# Patient Record
Sex: MALE | Race: WHITE | NOT HISPANIC OR LATINO | URBAN - METROPOLITAN AREA
[De-identification: names, ages, dates, MRNs, and addresses within clinical notes are randomized per-mention and may not be internally consistent; named-entity substitution may affect disease eponyms.]

---

## 2017-02-24 ENCOUNTER — OUTPATIENT (OUTPATIENT)
Dept: OUTPATIENT SERVICES | Facility: HOSPITAL | Age: 57
LOS: 1 days | Discharge: HOME | End: 2017-02-24

## 2017-06-21 ENCOUNTER — OUTPATIENT (OUTPATIENT)
Dept: OUTPATIENT SERVICES | Facility: HOSPITAL | Age: 57
LOS: 1 days | Discharge: HOME | End: 2017-06-21

## 2017-06-21 DIAGNOSIS — K56.609 UNSPECIFIED INTESTINAL OBSTRUCTION, UNSPECIFIED AS TO PARTIAL VERSUS COMPLETE OBSTRUCTION: ICD-10-CM

## 2017-06-27 DIAGNOSIS — B17.10 ACUTE HEPATITIS C WITHOUT HEPATIC COMA: ICD-10-CM

## 2017-06-27 DIAGNOSIS — B99.9 UNSPECIFIED INFECTIOUS DISEASE: ICD-10-CM

## 2017-06-27 DIAGNOSIS — B18.1 CHRONIC VIRAL HEPATITIS B WITHOUT DELTA-AGENT: ICD-10-CM

## 2017-06-27 DIAGNOSIS — B18.2 CHRONIC VIRAL HEPATITIS C: ICD-10-CM

## 2017-06-28 DIAGNOSIS — J98.4 OTHER DISORDERS OF LUNG: ICD-10-CM

## 2017-07-24 ENCOUNTER — OUTPATIENT (OUTPATIENT)
Dept: OUTPATIENT SERVICES | Facility: HOSPITAL | Age: 57
LOS: 1 days | Discharge: HOME | End: 2017-07-24

## 2017-07-24 DIAGNOSIS — K56.609 UNSPECIFIED INTESTINAL OBSTRUCTION, UNSPECIFIED AS TO PARTIAL VERSUS COMPLETE OBSTRUCTION: ICD-10-CM

## 2017-07-24 DIAGNOSIS — R19.00 INTRA-ABDOMINAL AND PELVIC SWELLING, MASS AND LUMP, UNSPECIFIED SITE: ICD-10-CM

## 2017-09-15 ENCOUNTER — OUTPATIENT (OUTPATIENT)
Dept: OUTPATIENT SERVICES | Facility: HOSPITAL | Age: 57
LOS: 1 days | Discharge: HOME | End: 2017-09-15

## 2017-09-15 DIAGNOSIS — K56.609 UNSPECIFIED INTESTINAL OBSTRUCTION, UNSPECIFIED AS TO PARTIAL VERSUS COMPLETE OBSTRUCTION: ICD-10-CM

## 2017-09-15 DIAGNOSIS — R91.8 OTHER NONSPECIFIC ABNORMAL FINDING OF LUNG FIELD: ICD-10-CM

## 2018-08-27 PROBLEM — Z00.00 ENCOUNTER FOR PREVENTIVE HEALTH EXAMINATION: Status: ACTIVE | Noted: 2018-08-27

## 2018-10-02 ENCOUNTER — APPOINTMENT (OUTPATIENT)
Dept: UROLOGY | Facility: CLINIC | Age: 58
End: 2018-10-02
Payer: COMMERCIAL

## 2018-10-02 VITALS
WEIGHT: 214 LBS | BODY MASS INDEX: 32.43 KG/M2 | SYSTOLIC BLOOD PRESSURE: 127 MMHG | HEART RATE: 86 BPM | HEIGHT: 68 IN | DIASTOLIC BLOOD PRESSURE: 82 MMHG

## 2018-10-02 PROCEDURE — 99204 OFFICE O/P NEW MOD 45 MIN: CPT

## 2018-11-05 ENCOUNTER — APPOINTMENT (OUTPATIENT)
Dept: UROLOGY | Facility: CLINIC | Age: 58
End: 2018-11-05

## 2018-11-06 ENCOUNTER — OUTPATIENT (OUTPATIENT)
Dept: OUTPATIENT SERVICES | Facility: HOSPITAL | Age: 58
LOS: 1 days | Discharge: HOME | End: 2018-11-06

## 2018-11-06 DIAGNOSIS — N52.9 MALE ERECTILE DYSFUNCTION, UNSPECIFIED: ICD-10-CM

## 2018-11-13 ENCOUNTER — APPOINTMENT (OUTPATIENT)
Dept: UROLOGY | Facility: CLINIC | Age: 58
End: 2018-11-13
Payer: COMMERCIAL

## 2018-11-13 VITALS
DIASTOLIC BLOOD PRESSURE: 86 MMHG | SYSTOLIC BLOOD PRESSURE: 122 MMHG | HEIGHT: 68 IN | WEIGHT: 214 LBS | HEART RATE: 71 BPM | BODY MASS INDEX: 32.43 KG/M2

## 2018-11-13 PROCEDURE — 99214 OFFICE O/P EST MOD 30 MIN: CPT

## 2018-11-19 ENCOUNTER — APPOINTMENT (OUTPATIENT)
Dept: UROLOGY | Facility: CLINIC | Age: 58
End: 2018-11-19
Payer: COMMERCIAL

## 2018-11-19 VITALS
WEIGHT: 214 LBS | HEIGHT: 68 IN | SYSTOLIC BLOOD PRESSURE: 120 MMHG | HEART RATE: 63 BPM | DIASTOLIC BLOOD PRESSURE: 79 MMHG | BODY MASS INDEX: 32.43 KG/M2

## 2018-11-19 PROCEDURE — 99214 OFFICE O/P EST MOD 30 MIN: CPT

## 2018-11-19 NOTE — HISTORY OF PRESENT ILLNESS
[Currently Experiencing ___] :  [unfilled] [Erectile Dysfunction] : Erectile Dysfunction [None] : None [FreeTextEntry1] : ALVAREZ BARRIOS is a 58 year old male with a past medical history of Nephrolithiasis and LUTS and following Dr. Sun. Presents to the office today for ED x 2 years and worsening over time. Patient is happily  x 32 years. Patient states that he is bale to get a weak erection and unable to sustain longer than one minute. Unable to penetrate x 3 months. No issues with ejaculation. Intermittent morning erections. Denies trauma to genital area. Upon self stimulation, patient states erection is weak. Patient has anxiety and discontinued Clonazepam 6 months ago. Has strong libido. In the past patient has tried Cialis and Viagra and no result and patient was getting headache. PSA 0.6 ng/ml , % Free PSA 41.7 % on 10/30/2018. \par \par IIEF-5  Score 6.- SEVERE ED\par \par \par 9/5/2018\par -Testosterone 440 ([id number])\par \par -Using sterile technique Trimix (30 mg papaverine, 1 mg phentolamine, 10 ug alprostadil) was injected. Injected 0.2 ml to the right corpora. The patient was instructed to apply pressure to the injection site for 5 minutes and I returned to reassess after 20 minutes. The results were as follows :    ---90--- % erections, and very satisfied with results.\par

## 2018-11-19 NOTE — LETTER BODY
[Dear  ___] : Dear  [unfilled], [Consult Letter:] : I had the pleasure of evaluating your patient, [unfilled]. [Please see my note below.] : Please see my note below. [Consult Closing:] : Thank you very much for allowing me to participate in the care of this patient.  If you have any questions, please do not hesitate to contact me. [Sincerely,] : Sincerely, [FreeTextEntry2] : Dr. Ed Anders

## 2018-11-19 NOTE — ASSESSMENT
[FreeTextEntry1] : ALVAREZ BARRIOS is a 58 year old male with a past medical history of Nephrolithiasis and LUTS and following Dr. Sun. Presents to the office today for ED x 2 years and worsening over time. Patient is happily  x 32 years. Patient states that he is bale to get a weak erection and unable to sustain longer than one minute. Unable to penetrate x 3 months. No issues with ejaculation. Intermittent morning erections. Denies trauma to genital area. Upon self stimulation, patient states erection is weak. Patient has anxiety and discontinued Clonazepam 6 months ago. Has strong libido. In the past patient has tried Cialis and Viagra and no result and patient was getting headache. PSA 0.6 ng/ml , % Free PSA 41.7 % on 10/30/2018. \par \par IIEF-5  Score 6.- SEVERE ED\par \par \par 9/5/2018\par -Testosterone 440 ([id number])\par \par -Using sterile technique Trimix (30 mg papaverine, 1 mg phentolamine, 10 ug alprostadil) was injected. Injected 0.2 ml to the right corpora. The patient was instructed to apply pressure to the injection site for 5 minutes and I returned to reassess after 20 minutes. The results were as follows :    ---90--- % erections, and very satisfied with results.

## 2019-02-05 ENCOUNTER — OUTPATIENT (OUTPATIENT)
Dept: OUTPATIENT SERVICES | Facility: HOSPITAL | Age: 59
LOS: 1 days | Discharge: HOME | End: 2019-02-05

## 2019-02-05 DIAGNOSIS — R91.8 OTHER NONSPECIFIC ABNORMAL FINDING OF LUNG FIELD: ICD-10-CM

## 2019-02-27 ENCOUNTER — APPOINTMENT (OUTPATIENT)
Dept: UROLOGY | Facility: CLINIC | Age: 59
End: 2019-02-27
Payer: COMMERCIAL

## 2019-02-27 VITALS — SYSTOLIC BLOOD PRESSURE: 127 MMHG | DIASTOLIC BLOOD PRESSURE: 85 MMHG | HEART RATE: 81 BPM

## 2019-02-27 VITALS — WEIGHT: 214 LBS | BODY MASS INDEX: 32.43 KG/M2 | HEIGHT: 68 IN

## 2019-02-27 PROCEDURE — 99213 OFFICE O/P EST LOW 20 MIN: CPT

## 2019-02-27 NOTE — HISTORY OF PRESENT ILLNESS
[FreeTextEntry1] : ALVAREZ BARRIOS is a 58 year old male with a past medical history of Nephrolithiasis and LUTS and following Dr. Sun. Presents to the office today for ED x 2 years and worsening over time. Patient is happily  x 32 years. Patient states that without trimix he is bale to get a weak erection and unable to sustain longer than one minute. Unable to penetrate x 3 months. No issues with ejaculation. Intermittent morning erections. Denies trauma to genital area. Upon self stimulation, patient states erection is weak. \par \par prescribed trimix last visit and he is very happy with 0.2ml of trimix\par \par Has strong libido. In the past patient has tried Cialis and Viagra and no result and patient was getting headache. PSA 0.6 ng/ml , % Free PSA 41.7 % on 10/30/2018. \par \par 9/5/2018\par -Testosterone 440 ([id number])

## 2019-02-27 NOTE — ASSESSMENT
[FreeTextEntry1] : ALVAREZ BARRIOS is a 58 year old male with a past medical history of Nephrolithiasis and LUTS and following Dr. Sun. Presents to the office today for ED x 2 years and worsening over time. Patient is happily  x 32 years. Patient states that without trimix he is bale to get a weak erection and unable to sustain longer than one minute. Unable to penetrate x 3 months. No issues with ejaculation. Intermittent morning erections. Denies trauma to genital area. Upon self stimulation, patient states erection is weak. \par \par prescribed trimix last visit and he is very happy with 0.2ml of trimix\par \par Has strong libido. In the past patient has tried Cialis and Viagra and no result and patient was getting headache. PSA 0.6 ng/ml , % Free PSA 41.7 % on 10/30/2018. \par \par 9/5/2018\par -Testosterone 440 ([id number])

## 2019-02-27 NOTE — PHYSICAL EXAM
[General Appearance - Well Developed] : well developed [General Appearance - Well Nourished] : well nourished [Normal Appearance] : normal appearance [Well Groomed] : well groomed [General Appearance - In No Acute Distress] : no acute distress [Abdomen Soft] : soft [Abdomen Tenderness] : non-tender [Costovertebral Angle Tenderness] : no ~M costovertebral angle tenderness [Skin Color & Pigmentation] : normal skin color and pigmentation [Edema] : no peripheral edema [] : no respiratory distress [Respiration, Rhythm And Depth] : normal respiratory rhythm and effort [Exaggerated Use Of Accessory Muscles For Inspiration] : no accessory muscle use [Oriented To Time, Place, And Person] : oriented to person, place, and time [Affect] : the affect was normal [Mood] : the mood was normal [Not Anxious] : not anxious [Normal Station and Gait] : the gait and station were normal for the patient's age [No Focal Deficits] : no focal deficits

## 2019-03-20 ENCOUNTER — APPOINTMENT (OUTPATIENT)
Dept: SURGERY | Facility: CLINIC | Age: 59
End: 2019-03-20
Payer: COMMERCIAL

## 2019-03-20 VITALS
BODY MASS INDEX: 31.83 KG/M2 | HEIGHT: 68 IN | SYSTOLIC BLOOD PRESSURE: 120 MMHG | DIASTOLIC BLOOD PRESSURE: 86 MMHG | WEIGHT: 210 LBS

## 2019-03-20 DIAGNOSIS — Z94.84 STEM CELLS TRANSPLANT STATUS: ICD-10-CM

## 2019-03-20 DIAGNOSIS — J34.2 DEVIATED NASAL SEPTUM: ICD-10-CM

## 2019-03-20 PROCEDURE — 99242 OFF/OP CONSLTJ NEW/EST SF 20: CPT

## 2019-03-20 NOTE — ASSESSMENT
[FreeTextEntry1] : 57yo male with umbilical hernia with overly calcified cyst and rectus diasthasis. \par -recommend no intervention for diasthasis\par -recommend open umbilical hernia repair with mesh and excision of cyst\par -explained risks/benefits/alternatives, all questions were answered\par -consent obtained for hernia repair\par -patient has been working out and trying to lose weight - encouraged to continue losing weight\par -will need PCP clearance and PAT's pre-operatively

## 2019-03-20 NOTE — HISTORY OF PRESENT ILLNESS
[de-identified] : 59yo male with PMHx of urinary issues referred for evaluation of umbilical hernia. Patient reports having an umbilical hernia his whole life; however, in the past year, he has noted a hardening and darkening of the overlying skin. Patient denies fever/chills, abdominal pain, nausea/vomiting, obstipation. He also notes that his upper abdomen peaks when he flexes his abdomen. He denies pain or discomfort in the area, but finds it superficially bothersome.

## 2019-03-20 NOTE — CONSULT LETTER
[Dear  ___] : Dear  [unfilled], [Consult Letter:] : I had the pleasure of evaluating your patient, [unfilled]. [Sincerely,] : Sincerely, [Please see my note below.] : Please see my note below. [Consult Closing:] : Thank you very much for allowing me to participate in the care of this patient.  If you have any questions, please do not hesitate to contact me. [FreeTextEntry3] : Natalia Slade MD\par Bariatric & Minimally Invasive Surgery\par [facility]\par [phone]

## 2019-03-20 NOTE — PHYSICAL EXAM
[Normal Breath Sounds] : Normal breath sounds [Normal Heart Sounds] : normal heart sounds [Abdominal Masses] : No abdominal masses [Abdomen Tenderness] : ~T ~M No abdominal tenderness [Alert] : alert [Calm] : calm [de-identified] : palpable approximately 2.5cm umbilical fascial defect without contents, overlying calcified skin lesion, non-tender, non-fluctuant [de-identified] : no acute distress [de-identified] : full ROM x 4

## 2019-03-27 ENCOUNTER — OUTPATIENT (OUTPATIENT)
Dept: OUTPATIENT SERVICES | Facility: HOSPITAL | Age: 59
LOS: 1 days | Discharge: HOME | End: 2019-03-27

## 2019-03-27 VITALS
TEMPERATURE: 98 F | DIASTOLIC BLOOD PRESSURE: 85 MMHG | HEIGHT: 68 IN | WEIGHT: 215.39 LBS | HEART RATE: 77 BPM | RESPIRATION RATE: 20 BRPM | SYSTOLIC BLOOD PRESSURE: 145 MMHG | OXYGEN SATURATION: 100 %

## 2019-03-27 DIAGNOSIS — Z98.890 OTHER SPECIFIED POSTPROCEDURAL STATES: Chronic | ICD-10-CM

## 2019-03-27 DIAGNOSIS — Z01.818 ENCOUNTER FOR OTHER PREPROCEDURAL EXAMINATION: ICD-10-CM

## 2019-03-27 DIAGNOSIS — K42.9 UMBILICAL HERNIA WITHOUT OBSTRUCTION OR GANGRENE: ICD-10-CM

## 2019-03-27 LAB
ALBUMIN SERPL ELPH-MCNC: 4.5 G/DL — SIGNIFICANT CHANGE UP (ref 3.5–5.2)
ALP SERPL-CCNC: 58 U/L — SIGNIFICANT CHANGE UP (ref 30–115)
ALT FLD-CCNC: 35 U/L — SIGNIFICANT CHANGE UP (ref 0–41)
ANION GAP SERPL CALC-SCNC: 13 MMOL/L — SIGNIFICANT CHANGE UP (ref 7–14)
APTT BLD: 32.8 SEC — SIGNIFICANT CHANGE UP (ref 27–39.2)
AST SERPL-CCNC: 27 U/L — SIGNIFICANT CHANGE UP (ref 0–41)
BASOPHILS # BLD AUTO: 0.08 K/UL — SIGNIFICANT CHANGE UP (ref 0–0.2)
BASOPHILS NFR BLD AUTO: 1 % — SIGNIFICANT CHANGE UP (ref 0–1)
BILIRUB SERPL-MCNC: <0.2 MG/DL — SIGNIFICANT CHANGE UP (ref 0.2–1.2)
BUN SERPL-MCNC: 24 MG/DL — HIGH (ref 10–20)
CALCIUM SERPL-MCNC: 9.6 MG/DL — SIGNIFICANT CHANGE UP (ref 8.5–10.1)
CHLORIDE SERPL-SCNC: 102 MMOL/L — SIGNIFICANT CHANGE UP (ref 98–110)
CO2 SERPL-SCNC: 25 MMOL/L — SIGNIFICANT CHANGE UP (ref 17–32)
CREAT SERPL-MCNC: 1.3 MG/DL — SIGNIFICANT CHANGE UP (ref 0.7–1.5)
EOSINOPHIL # BLD AUTO: 0.14 K/UL — SIGNIFICANT CHANGE UP (ref 0–0.7)
EOSINOPHIL NFR BLD AUTO: 1.8 % — SIGNIFICANT CHANGE UP (ref 0–8)
GLUCOSE SERPL-MCNC: 93 MG/DL — SIGNIFICANT CHANGE UP (ref 70–99)
HCT VFR BLD CALC: 44 % — SIGNIFICANT CHANGE UP (ref 42–52)
HGB BLD-MCNC: 14.3 G/DL — SIGNIFICANT CHANGE UP (ref 14–18)
IMM GRANULOCYTES NFR BLD AUTO: 0.3 % — SIGNIFICANT CHANGE UP (ref 0.1–0.3)
INR BLD: 0.97 RATIO — SIGNIFICANT CHANGE UP (ref 0.65–1.3)
LYMPHOCYTES # BLD AUTO: 1.35 K/UL — SIGNIFICANT CHANGE UP (ref 1.2–3.4)
LYMPHOCYTES # BLD AUTO: 17 % — LOW (ref 20.5–51.1)
MCHC RBC-ENTMCNC: 27.7 PG — SIGNIFICANT CHANGE UP (ref 27–31)
MCHC RBC-ENTMCNC: 32.5 G/DL — SIGNIFICANT CHANGE UP (ref 32–37)
MCV RBC AUTO: 85.1 FL — SIGNIFICANT CHANGE UP (ref 80–94)
MONOCYTES # BLD AUTO: 0.64 K/UL — HIGH (ref 0.1–0.6)
MONOCYTES NFR BLD AUTO: 8.1 % — SIGNIFICANT CHANGE UP (ref 1.7–9.3)
NEUTROPHILS # BLD AUTO: 5.69 K/UL — SIGNIFICANT CHANGE UP (ref 1.4–6.5)
NEUTROPHILS NFR BLD AUTO: 71.8 % — SIGNIFICANT CHANGE UP (ref 42.2–75.2)
NRBC # BLD: 0 /100 WBCS — SIGNIFICANT CHANGE UP (ref 0–0)
PLATELET # BLD AUTO: 266 K/UL — SIGNIFICANT CHANGE UP (ref 130–400)
POTASSIUM SERPL-MCNC: 4.6 MMOL/L — SIGNIFICANT CHANGE UP (ref 3.5–5)
POTASSIUM SERPL-SCNC: 4.6 MMOL/L — SIGNIFICANT CHANGE UP (ref 3.5–5)
PROT SERPL-MCNC: 7.1 G/DL — SIGNIFICANT CHANGE UP (ref 6–8)
PROTHROM AB SERPL-ACNC: 11.2 SEC — SIGNIFICANT CHANGE UP (ref 9.95–12.87)
RBC # BLD: 5.17 M/UL — SIGNIFICANT CHANGE UP (ref 4.7–6.1)
RBC # FLD: 13.1 % — SIGNIFICANT CHANGE UP (ref 11.5–14.5)
SODIUM SERPL-SCNC: 140 MMOL/L — SIGNIFICANT CHANGE UP (ref 135–146)
WBC # BLD: 7.92 K/UL — SIGNIFICANT CHANGE UP (ref 4.8–10.8)
WBC # FLD AUTO: 7.92 K/UL — SIGNIFICANT CHANGE UP (ref 4.8–10.8)

## 2019-03-27 NOTE — H&P PST ADULT - HISTORY OF PRESENT ILLNESS
57 y/o male scheduled for umbilical hernia repair  reports no c/o cp,sob,palpitations,cough or dysuria  1-2 fos without sob

## 2019-03-28 DIAGNOSIS — F41.9 ANXIETY DISORDER, UNSPECIFIED: ICD-10-CM

## 2019-04-01 ENCOUNTER — OUTPATIENT (OUTPATIENT)
Dept: OUTPATIENT SERVICES | Facility: HOSPITAL | Age: 59
LOS: 1 days | Discharge: HOME | End: 2019-04-01
Payer: COMMERCIAL

## 2019-04-01 ENCOUNTER — RESULT REVIEW (OUTPATIENT)
Age: 59
End: 2019-04-01

## 2019-04-01 VITALS
RESPIRATION RATE: 20 BRPM | DIASTOLIC BLOOD PRESSURE: 81 MMHG | SYSTOLIC BLOOD PRESSURE: 133 MMHG | HEIGHT: 68 IN | TEMPERATURE: 98 F | HEART RATE: 72 BPM | WEIGHT: 214.95 LBS

## 2019-04-01 VITALS — RESPIRATION RATE: 16 BRPM | DIASTOLIC BLOOD PRESSURE: 86 MMHG | SYSTOLIC BLOOD PRESSURE: 143 MMHG | HEART RATE: 54 BPM

## 2019-04-01 DIAGNOSIS — Z98.890 OTHER SPECIFIED POSTPROCEDURAL STATES: Chronic | ICD-10-CM

## 2019-04-01 PROCEDURE — 88302 TISSUE EXAM BY PATHOLOGIST: CPT | Mod: 26

## 2019-04-01 PROCEDURE — 49585: CPT

## 2019-04-01 RX ORDER — HYDROMORPHONE HYDROCHLORIDE 2 MG/ML
0.5 INJECTION INTRAMUSCULAR; INTRAVENOUS; SUBCUTANEOUS
Qty: 0 | Refills: 0 | Status: DISCONTINUED | OUTPATIENT
Start: 2019-04-01 | End: 2019-04-01

## 2019-04-01 RX ORDER — HYDROMORPHONE HYDROCHLORIDE 2 MG/ML
1 INJECTION INTRAMUSCULAR; INTRAVENOUS; SUBCUTANEOUS
Qty: 0 | Refills: 0 | Status: DISCONTINUED | OUTPATIENT
Start: 2019-04-01 | End: 2019-04-01

## 2019-04-01 RX ORDER — ONDANSETRON 8 MG/1
4 TABLET, FILM COATED ORAL ONCE
Qty: 0 | Refills: 0 | Status: DISCONTINUED | OUTPATIENT
Start: 2019-04-01 | End: 2019-04-16

## 2019-04-01 RX ORDER — DOCUSATE SODIUM 100 MG
1 CAPSULE ORAL
Qty: 20 | Refills: 0 | OUTPATIENT
Start: 2019-04-01

## 2019-04-01 RX ORDER — SODIUM CHLORIDE 9 MG/ML
1000 INJECTION, SOLUTION INTRAVENOUS
Qty: 0 | Refills: 0 | Status: DISCONTINUED | OUTPATIENT
Start: 2019-04-01 | End: 2019-04-16

## 2019-04-01 RX ADMIN — SODIUM CHLORIDE 100 MILLILITER(S): 9 INJECTION, SOLUTION INTRAVENOUS at 14:28

## 2019-04-01 RX ADMIN — HYDROMORPHONE HYDROCHLORIDE 1 MILLIGRAM(S): 2 INJECTION INTRAMUSCULAR; INTRAVENOUS; SUBCUTANEOUS at 15:19

## 2019-04-01 RX ADMIN — HYDROMORPHONE HYDROCHLORIDE 0.5 MILLIGRAM(S): 2 INJECTION INTRAMUSCULAR; INTRAVENOUS; SUBCUTANEOUS at 14:30

## 2019-04-01 RX ADMIN — HYDROMORPHONE HYDROCHLORIDE 1 MILLIGRAM(S): 2 INJECTION INTRAMUSCULAR; INTRAVENOUS; SUBCUTANEOUS at 15:34

## 2019-04-01 RX ADMIN — HYDROMORPHONE HYDROCHLORIDE 0.5 MILLIGRAM(S): 2 INJECTION INTRAMUSCULAR; INTRAVENOUS; SUBCUTANEOUS at 15:08

## 2019-04-01 NOTE — CHART NOTE - NSCHARTNOTEFT_GEN_A_CORE
PACU ANESTHESIA ADMISSION NOTE      Procedure: Umbilical herniorrhaphy  Post op diagnosis:  umbilical hernia,     ____  Intubated  TV:______       Rate: ______      FiO2: ______    _x___  Patent Airway    _x___  Full return of protective reflexes    _x___  Full recovery from anesthesia / back to baseline status    Vitals:  see anesthesia record    Mental Status:  _x___ Awake   _____ Alert   _____ Drowsy   _____ Sedated    Nausea/Vomiting:  _x___  NO       ______Yes,   See Post - Op Orders         Pain Scale (0-10):  _____    Treatment: ____ None    __x__ See Post - Op/PCA Orders    Post - Operative Fluids:   ____ Oral   ___x_ See Post - Op Orders    Plan: Discharge:   __x__Home       _____Floor     _____Critical Care    _____  Other:_________________    Comments:  No anesthesia issues or complications noted.  Discharge when criteria met.

## 2019-04-01 NOTE — ASU DISCHARGE PLAN (ADULT/PEDIATRIC) - CARE PROVIDER_API CALL
Natalia Slade)  [facility]  [address] [place] [address]  Phone: [phone]  Fax: [phone]  Follow Up Time:

## 2019-04-04 LAB — SURGICAL PATHOLOGY STUDY: SIGNIFICANT CHANGE UP

## 2019-04-06 DIAGNOSIS — L72.0 EPIDERMAL CYST: ICD-10-CM

## 2019-04-06 DIAGNOSIS — G47.33 OBSTRUCTIVE SLEEP APNEA (ADULT) (PEDIATRIC): ICD-10-CM

## 2019-04-06 DIAGNOSIS — K42.9 UMBILICAL HERNIA WITHOUT OBSTRUCTION OR GANGRENE: ICD-10-CM

## 2019-04-06 DIAGNOSIS — Z99.89 DEPENDENCE ON OTHER ENABLING MACHINES AND DEVICES: ICD-10-CM

## 2019-04-12 ENCOUNTER — APPOINTMENT (OUTPATIENT)
Dept: SURGERY | Facility: CLINIC | Age: 59
End: 2019-04-12

## 2019-04-12 VITALS
WEIGHT: 210 LBS | DIASTOLIC BLOOD PRESSURE: 86 MMHG | BODY MASS INDEX: 31.83 KG/M2 | HEIGHT: 68 IN | SYSTOLIC BLOOD PRESSURE: 120 MMHG

## 2019-04-12 DIAGNOSIS — L72.0 EPIDERMAL CYST: ICD-10-CM

## 2019-04-12 DIAGNOSIS — Z83.3 FAMILY HISTORY OF DIABETES MELLITUS: ICD-10-CM

## 2019-04-12 DIAGNOSIS — Z80.43 FAMILY HISTORY OF MALIGNANT NEOPLASM OF TESTIS: ICD-10-CM

## 2019-04-12 DIAGNOSIS — Z78.9 OTHER SPECIFIED HEALTH STATUS: ICD-10-CM

## 2019-04-12 DIAGNOSIS — Z80.3 FAMILY HISTORY OF MALIGNANT NEOPLASM OF BREAST: ICD-10-CM

## 2019-04-12 NOTE — PHYSICAL EXAM
[Normal Breath Sounds] : Normal breath sounds [Normal Heart Sounds] : normal heart sounds [Abdominal Masses] : No abdominal masses [Abdomen Tenderness] : ~T ~M No abdominal tenderness [Alert] : alert [Calm] : calm [de-identified] : no acute distress [de-identified] : well-healing vertical umbilical incision without erythema, induration or drainage; non-tender, +rectus diasthasis [de-identified] : left upper back lipomatous mass 5x5cm, non-tender, no overlying skin changes [de-identified] : full ROM x 4

## 2019-04-12 NOTE — REVIEW OF SYSTEMS
[As Noted in HPI] : as noted in HPI [Negative] : Musculoskeletal [de-identified] : left upper back lipomatous lesion

## 2019-04-12 NOTE — ASSESSMENT
[FreeTextEntry1] : 57yo male with umbilical hernia with associated inclusion cyst s/p open umbilical hernia repair and excision of inclusion cyst. Also has rectus diasthasis and newly noted left upper back lipomatous mass. \par -recommend no intervention for diasthasis\par -recovering well from umbilical hernia repair \par -pathology of cyst - inclusion cyst, report given to patient\par -plan for excision of left upper back lipomatous mass\par -explained risks/benefits/alternatives, all questions were answered\par -consent obtained for lipoma excision\par -will NOT need PCP clearance and PAT's pre-operatively as he recently underwent evaluation for surgery done on 4/1\par -will contact patient to schedule surgery

## 2019-04-12 NOTE — HISTORY OF PRESENT ILLNESS
[de-identified] : 57yo male with PMHx of urinary issues referred for evaluation of umbilical hernia. Patient reports having an umbilical hernia his whole life; however, in the past year, he has noted a hardening and darkening of the overlying skin. Patient denies fever/chills, abdominal pain, nausea/vomiting, obstipation. He also notes that his upper abdomen peaks when he flexes his abdomen. He denies pain or discomfort in the area, but finds it superficially bothersome. On 4/1/2019, patient underwent open umbilical hernia repair with mesh and excision of umbilical cyst. He states that he has some pain on the left and right flanks when he sits up but it is minimal. Otherwise, he denies fever/chills, nausea/vomiting, chest pain or shortness of breath. He is tolerating diet and having regular bowel movements. \par \par He recently noted a back mass as well. He states that it does not hurt, but it bothers him and would like it removed. \par

## 2019-04-12 NOTE — CONSULT LETTER
[Dear  ___] : Dear  [unfilled], [Consult Letter:] : I had the pleasure of evaluating your patient, [unfilled]. [Please see my note below.] : Please see my note below. [Consult Closing:] : Thank you very much for allowing me to participate in the care of this patient.  If you have any questions, please do not hesitate to contact me. [Sincerely,] : Sincerely, [FreeTextEntry3] : Natalia Slade MD\par Bariatric & Minimally Invasive Surgery\par [facility]\par [phone]

## 2019-04-19 PROBLEM — G47.33 OBSTRUCTIVE SLEEP APNEA (ADULT) (PEDIATRIC): Chronic | Status: ACTIVE | Noted: 2019-03-27

## 2019-04-22 NOTE — ASU PATIENT PROFILE, ADULT - FALL HARM RISK CONCLUSION
You are being discharged to a transitional care unit:  [facility]  Phone:  [phone]  Fax:  [phone]    
Universal Safety Interventions

## 2019-04-22 NOTE — ASU PATIENT PROFILE, ADULT - PSH
H/O shoulder surgery  left  H/O sinus surgery    Hernia, umbilical    S/P tonsillectomy    Umbilical mass  cyst removed H/O shoulder surgery  left  H/O sinus surgery    H/O stem cell transplant  bilateral knees  Hernia, umbilical    S/P tonsillectomy    Umbilical mass  cyst removed

## 2019-04-23 ENCOUNTER — OUTPATIENT (OUTPATIENT)
Dept: OUTPATIENT SERVICES | Facility: HOSPITAL | Age: 59
LOS: 1 days | Discharge: HOME | End: 2019-04-23
Payer: COMMERCIAL

## 2019-04-23 ENCOUNTER — RESULT REVIEW (OUTPATIENT)
Age: 59
End: 2019-04-23

## 2019-04-23 VITALS
DIASTOLIC BLOOD PRESSURE: 73 MMHG | SYSTOLIC BLOOD PRESSURE: 117 MMHG | HEART RATE: 64 BPM | RESPIRATION RATE: 16 BRPM | OXYGEN SATURATION: 99 %

## 2019-04-23 VITALS
TEMPERATURE: 99 F | DIASTOLIC BLOOD PRESSURE: 79 MMHG | RESPIRATION RATE: 16 BRPM | OXYGEN SATURATION: 98 % | WEIGHT: 205.03 LBS | HEART RATE: 79 BPM | HEIGHT: 68 IN | SYSTOLIC BLOOD PRESSURE: 132 MMHG

## 2019-04-23 DIAGNOSIS — K42.9 UMBILICAL HERNIA WITHOUT OBSTRUCTION OR GANGRENE: Chronic | ICD-10-CM

## 2019-04-23 DIAGNOSIS — Z98.890 OTHER SPECIFIED POSTPROCEDURAL STATES: Chronic | ICD-10-CM

## 2019-04-23 DIAGNOSIS — R19.09 OTHER INTRA-ABDOMINAL AND PELVIC SWELLING, MASS AND LUMP: Chronic | ICD-10-CM

## 2019-04-23 DIAGNOSIS — Z94.84 STEM CELLS TRANSPLANT STATUS: Chronic | ICD-10-CM

## 2019-04-23 DIAGNOSIS — Z90.89 ACQUIRED ABSENCE OF OTHER ORGANS: Chronic | ICD-10-CM

## 2019-04-23 PROCEDURE — 88304 TISSUE EXAM BY PATHOLOGIST: CPT | Mod: 26

## 2019-04-23 PROCEDURE — 21930 EXC BACK LES SC < 3 CM: CPT | Mod: 78

## 2019-04-23 RX ORDER — SODIUM CHLORIDE 9 MG/ML
1000 INJECTION, SOLUTION INTRAVENOUS
Qty: 0 | Refills: 0 | Status: DISCONTINUED | OUTPATIENT
Start: 2019-04-23 | End: 2019-05-08

## 2019-04-23 RX ORDER — ONDANSETRON 8 MG/1
4 TABLET, FILM COATED ORAL ONCE
Qty: 0 | Refills: 0 | Status: DISCONTINUED | OUTPATIENT
Start: 2019-04-23 | End: 2019-05-08

## 2019-04-23 RX ORDER — BUPROPION HYDROCHLORIDE 150 MG/1
1 TABLET, EXTENDED RELEASE ORAL
Qty: 0 | Refills: 0 | COMMUNITY

## 2019-04-23 RX ORDER — OXYCODONE AND ACETAMINOPHEN 5; 325 MG/1; MG/1
1 TABLET ORAL ONCE
Qty: 0 | Refills: 0 | Status: DISCONTINUED | OUTPATIENT
Start: 2019-04-23 | End: 2019-04-23

## 2019-04-23 RX ORDER — DOCUSATE SODIUM 100 MG
1 CAPSULE ORAL
Qty: 30 | Refills: 0
Start: 2019-04-23

## 2019-04-23 RX ORDER — OXYCODONE AND ACETAMINOPHEN 5; 325 MG/1; MG/1
1 TABLET ORAL
Qty: 0 | Refills: 0 | DISCHARGE
Start: 2019-04-23

## 2019-04-23 RX ORDER — HYDROMORPHONE HYDROCHLORIDE 2 MG/ML
0.5 INJECTION INTRAMUSCULAR; INTRAVENOUS; SUBCUTANEOUS
Qty: 0 | Refills: 0 | Status: DISCONTINUED | OUTPATIENT
Start: 2019-04-23 | End: 2019-04-23

## 2019-04-23 RX ADMIN — HYDROMORPHONE HYDROCHLORIDE 0.5 MILLIGRAM(S): 2 INJECTION INTRAMUSCULAR; INTRAVENOUS; SUBCUTANEOUS at 14:56

## 2019-04-23 RX ADMIN — SODIUM CHLORIDE 100 MILLILITER(S): 9 INJECTION, SOLUTION INTRAVENOUS at 14:44

## 2019-04-23 NOTE — ASU DISCHARGE PLAN (ADULT/PEDIATRIC) - ASU DC SPECIAL INSTRUCTIONSFT
1. Continue using all home medications. Use Tylenols for mild pain. Use Percocet for severe/breakthrough pain as needed.  2. Continue regular diet and fluid intake as tolerated.  3. Ambulate as tolerated.  4. Shower is okay to take.  5. Do not lift heavy weight (10 lbs or more) for 4-6 weeks.  6. Follow-up with Dr. Slade in her Clinic as per schedule.  7. In case of any worsening of symptoms/signs, please go to nearby Emergency Department or call 911.

## 2019-04-23 NOTE — BRIEF OPERATIVE NOTE - NSICDXBRIEFPOSTOP_GEN_ALL_CORE_FT
POST-OP DIAGNOSIS:  Umbilical mass 01-Apr-2019 13:46:40  [facility]  Umbilical hernia 01-Apr-2019 13:46:21  [facility]
POST-OP DIAGNOSIS:  Lipoma of chest wall 23-Apr-2019 14:58:45  [facility]

## 2019-04-23 NOTE — BRIEF OPERATIVE NOTE - OPERATION/FINDINGS
umbilical mass with umbilical hernia. mesh repair (small ventralex mesh)
Removal of Left chest wall lipoma, densely adherent to the surrounding tissues

## 2019-04-23 NOTE — BRIEF OPERATIVE NOTE - NSICDXBRIEFPREOP_GEN_ALL_CORE_FT
PRE-OP DIAGNOSIS:  Umbilical mass 01-Apr-2019 13:47:03  [facility]  Umbilical hernia 01-Apr-2019 13:43:27  [facility]
PRE-OP DIAGNOSIS:  Lipoma of chest wall 23-Apr-2019 14:58:26  [facility]

## 2019-04-23 NOTE — ASU DISCHARGE PLAN (ADULT/PEDIATRIC) - FOLLOW UP APPOINTMENTS
[phone]/[facility]:  Endoscopy/Ambulatory Surgery [place]... [phone]/[facility]:  [facility]…

## 2019-04-23 NOTE — BRIEF OPERATIVE NOTE - NSICDXBRIEFPROCEDURE_GEN_ALL_CORE_FT
PROCEDURES:  Excision of umbilical mass 01-Apr-2019 13:46:07  [facility]  Repair, hernia, umbilical, open, adult 01-Apr-2019 13:45:48  [facility]
PROCEDURES:  Excision of benign skin lesion (excluding skin tags) of chest wall, excised area 3.6 to 4.0cm in diameter, including margins 23-Apr-2019 14:58:05  [facility]

## 2019-04-23 NOTE — ASU DISCHARGE PLAN (ADULT/PEDIATRIC) - CARE PROVIDER_API CALL
Natalia Slade)  [facility]  [address] [place] [address]  Phone: [phone]  Fax: [phone]  Follow Up Time:

## 2019-04-25 LAB — SURGICAL PATHOLOGY STUDY: SIGNIFICANT CHANGE UP

## 2019-04-29 DIAGNOSIS — D17.1 BENIGN LIPOMATOUS NEOPLASM OF SKIN AND SUBCUTANEOUS TISSUE OF TRUNK: ICD-10-CM

## 2019-04-29 PROBLEM — F32.9 MAJOR DEPRESSIVE DISORDER, SINGLE EPISODE, UNSPECIFIED: Chronic | Status: ACTIVE | Noted: 2019-04-23

## 2019-05-03 ENCOUNTER — APPOINTMENT (OUTPATIENT)
Dept: SURGERY | Facility: CLINIC | Age: 59
End: 2019-05-03
Payer: COMMERCIAL

## 2019-05-03 VITALS
DIASTOLIC BLOOD PRESSURE: 84 MMHG | WEIGHT: 205 LBS | SYSTOLIC BLOOD PRESSURE: 142 MMHG | BODY MASS INDEX: 31.07 KG/M2 | HEIGHT: 68 IN

## 2019-05-03 PROCEDURE — 99024 POSTOP FOLLOW-UP VISIT: CPT

## 2019-05-03 PROCEDURE — 10140 I&D HMTMA SEROMA/FLUID COLLJ: CPT | Mod: 78

## 2019-05-03 NOTE — HISTORY OF PRESENT ILLNESS
[de-identified] : 59yo male s/p left upper back lipoma excision 4/23/2019 presents for post-operative evaluation. He states that he was doing well for the first few days, but then he noted swelling under the incision over the past week. He also states that the area is quite tender. Denies purulent drainage of redness of the overlying skin. Denies abdominal pain, fever/chills, nausea/vomiting, chest pain or shortness of breath. Tolerating diet.

## 2019-05-03 NOTE — PHYSICAL EXAM
[Normal Heart Sounds] : normal heart sounds [Normal Breath Sounds] : Normal breath sounds [Alert] : alert [Calm] : calm [Abdominal Masses] : No abdominal masses [Abdomen Tenderness] : ~T ~M No abdominal tenderness [de-identified] : no acute distress [de-identified] : well-healing vertical umbilical incision without erythema, induration or drainage; non-tender, +rectus diasthasis [de-identified] : full ROM x 4 [de-identified] : left upper back fluid-filled swelling underneath a well-healing skin incision, +tenderness, no overlying skin changes, no expressible drainage

## 2019-05-03 NOTE — ASSESSMENT
[FreeTextEntry1] : 57yo male s/p excision of left upper back lipoma 4/23/19 with post-operative seroma. \par -incision and drainage of seroma in office - approximately 20cc\par -instructed wife for dressing changes - wick of gauze to keep skin open while draining\par -patient reported feeling better after drainage\par -return to office in 1 week

## 2019-05-10 ENCOUNTER — APPOINTMENT (OUTPATIENT)
Dept: SURGERY | Facility: CLINIC | Age: 59
End: 2019-05-10
Payer: COMMERCIAL

## 2019-05-10 VITALS
BODY MASS INDEX: 31.07 KG/M2 | WEIGHT: 205 LBS | HEIGHT: 68 IN | SYSTOLIC BLOOD PRESSURE: 132 MMHG | DIASTOLIC BLOOD PRESSURE: 82 MMHG

## 2019-05-10 DIAGNOSIS — K42.9 UMBILICAL HERNIA W/OUT OBSTRUCTION OR GANGRENE: ICD-10-CM

## 2019-05-10 DIAGNOSIS — Z86.018 PERSONAL HISTORY OF OTHER BENIGN NEOPLASM: ICD-10-CM

## 2019-05-10 PROCEDURE — 99024 POSTOP FOLLOW-UP VISIT: CPT

## 2019-05-11 PROBLEM — K42.9 UMBILICAL HERNIA WITHOUT OBSTRUCTION AND WITHOUT GANGRENE: Status: RESOLVED | Noted: 2019-04-12 | Resolved: 2019-05-11

## 2019-05-11 PROBLEM — Z86.018 HISTORY OF LIPOMA OF BACK: Status: RESOLVED | Noted: 2019-04-12 | Resolved: 2019-05-11

## 2019-05-11 NOTE — ASSESSMENT
[FreeTextEntry1] : 57yo male s/p umbilical hernia repair with umbilical cyst excision 4/1/19 s/p excision of left upper back lipoma 4/23/19 with post-operative back seroma s/p incision and drainage. \par -attempted to open incision further without successful access to seroma\par -closed wound with steristrips\par -needle aspiration of seroma - approximately 15cc serosanginous fluid\par -patient reported feeling better after drainage\par -explained to patient that he will likely require multiple aspirations but should get smaller and become less over time\par -return to office in 1 week\par -patient has plans to travel in 2 weeks; however, I [place] with [place]. Will reassess in 1 week at follow up visit

## 2019-05-11 NOTE — HISTORY OF PRESENT ILLNESS
[de-identified] : 57yo male s/p open umbilical hernia repair with mesh and excision of umbilical cyst 4/1/2019 s/p left upper back lipoma excision 4/23/2019 presents for post-operative evaluation. He has not complaints of his abdominal wound. Denies abdominal pain, nausea/vomiting, chest pain or shortness of breath. Tolerating diet. His upper back has been tender and swollen. At his last visit, it was noted that he had a seroma. This was drained in the office through incision and drainage. His spouse was performing dressing changes, however the area became swollen and tender again. He continues to deny fevers or wound drainage.

## 2019-05-17 ENCOUNTER — APPOINTMENT (OUTPATIENT)
Dept: SURGERY | Facility: CLINIC | Age: 59
End: 2019-05-17
Payer: COMMERCIAL

## 2019-05-17 VITALS
WEIGHT: 204 LBS | HEIGHT: 68 IN | BODY MASS INDEX: 30.92 KG/M2 | SYSTOLIC BLOOD PRESSURE: 126 MMHG | DIASTOLIC BLOOD PRESSURE: 82 MMHG

## 2019-05-17 DIAGNOSIS — M96.842 POSTPROCEDURAL SEROMA OF A MUSCULOSKELETAL STRUCTURE FOLLOWING A MUSCULOSKELETAL SYSTEM PROCEDURE: ICD-10-CM

## 2019-05-17 PROCEDURE — 99024 POSTOP FOLLOW-UP VISIT: CPT

## 2019-05-17 NOTE — ASSESSMENT
[FreeTextEntry1] : 57yo male s/p umbilical hernia repair with umbilical cyst excision 4/1/19 s/p excision of left upper back lipoma 4/23/19 with post-operative back seroma s/p incision and drainage. \par -approximated skin opening with skin glue\par -recommended ice packs for itching\par -return to office PRN - particularly fevers, worsening pain, drainage from wound or swelling of wound\par

## 2019-05-17 NOTE — HISTORY OF PRESENT ILLNESS
[de-identified] : 59yo male s/p open umbilical hernia repair with mesh and excision of umbilical cyst 4/1/2019 s/p left upper back lipoma excision 4/23/2019 presents for post-operative evaluation. A seroma developed in his back wound that he underwent incision and drainage in the office, then needle aspiration the last week. He returns for another wound assessment. He reports he does not have pain and does not feel the wound swelling up again. He reports itchiness of the skin, but otherwise no fevers/chills, drainage or swelling.

## 2019-05-17 NOTE — PHYSICAL EXAM
[Normal Breath Sounds] : Normal breath sounds [Normal Heart Sounds] : normal heart sounds [Abdominal Masses] : No abdominal masses [Calm] : calm [Alert] : alert [Abdomen Tenderness] : ~T ~M No abdominal tenderness [de-identified] : no acute distress [de-identified] : well-healing vertical umbilical incision without erythema, induration or drainage; non-tender, +rectus diasthasis [de-identified] : full ROM x 4 [de-identified] : left upper back well-healing scar without underlying swelling, 0.5cm skin opening at inferior aspect of wound, no expressible drainage, no induration, non-tender, no erythema

## 2019-11-14 ENCOUNTER — APPOINTMENT (OUTPATIENT)
Dept: UROLOGY | Facility: CLINIC | Age: 59
End: 2019-11-14
Payer: COMMERCIAL

## 2019-11-14 PROCEDURE — 99213 OFFICE O/P EST LOW 20 MIN: CPT

## 2019-11-14 NOTE — PHYSICAL EXAM
[General Appearance - Well Developed] : well developed [General Appearance - Well Nourished] : well nourished [General Appearance - In No Acute Distress] : no acute distress [Normal Appearance] : normal appearance [Well Groomed] : well groomed [Abdomen Soft] : soft [Costovertebral Angle Tenderness] : no ~M costovertebral angle tenderness [Abdomen Tenderness] : non-tender [Urethral Meatus] : meatus normal [Urinary Bladder Findings] : the bladder was normal on palpation [Prostate Size ___ gm] : prostate size [unfilled] gm [No Prostate Nodules] : no prostate nodules [Scrotum] : the scrotum was normal [Testes Mass (___cm)] : there were no testicular masses [Edema] : no peripheral edema [] : no rash [Oriented To Time, Place, And Person] : oriented to person, place, and time [Respiration, Rhythm And Depth] : normal respiratory rhythm and effort [Exaggerated Use Of Accessory Muscles For Inspiration] : no accessory muscle use [Affect] : the affect was normal [Mood] : the mood was normal [Not Anxious] : not anxious [Normal Station and Gait] : the gait and station were normal for the patient's age [No Palpable Adenopathy] : no palpable adenopathy

## 2019-11-14 NOTE — ASSESSMENT
[FreeTextEntry1] : 58-year-old male with a history of erectile dysfunction refractory to phosphodiesterase 5 inhibitors along with kidney stones and mild non-bothersome lower urinary tract symptoms now resolved.\par Renal sono assess for stones\par PSA pending (obtaining results from lab [facility] today)\par FU 1 year

## 2019-11-14 NOTE — HISTORY OF PRESENT ILLNESS
[FreeTextEntry1] : 58-year-old male with a history of erectile dysfunction refractory to phosphodiesterase 5 inhibitors along with kidney stones and mild non-bothersome lower urinary tract symptoms.\par Patient has since been seeing Dr. Ramirez with good results using ICI.\par His lower urinary tract symptoms including nocturia also improved with behavioral management. He denies any gross hematuria or dysuria.\par Has not had any episodes of kidney stones or flank pain since last visit.\par \par Prev-\par Regarding kidney stones, patient reports he has had previous cystoscopies however it does not sound like he had a prior ureteroscopy. He states he usually passes the stones and the last stone that was passed was in June 2018.\par Previously he was followed by an outside urologist and now would like to transfer care here.\par

## 2020-03-02 ENCOUNTER — APPOINTMENT (OUTPATIENT)
Dept: UROLOGY | Facility: CLINIC | Age: 60
End: 2020-03-02
Payer: COMMERCIAL

## 2020-03-02 PROCEDURE — 99214 OFFICE O/P EST MOD 30 MIN: CPT

## 2020-03-02 NOTE — ASSESSMENT
[FreeTextEntry1] : ALVAREZ BARRIOS is a 58 year old male with a past medical history of Nephrolithiasis and LUTS and following Dr. Sun. Presents to the office today for ED x 2 years and worsening over time. Patient is happily  x 32 years. Patient states that without trimix he is bale to get a weak erection and unable to sustain longer than one minute. Unable to penetrate x 3 months. No issues with ejaculation. Intermittent morning erections. Denies trauma to genital area. Upon self stimulation, patient states erection is weak. \par \par prescribed trimix last visit and he is very happy with 0.2ml of trimix has great results with 0.2ml but frustrated with loss of spontaneity \par \par IIEF 11 - moderate ED \par \par His lower urinary tract symptoms including nocturia also improved with behavioral management. He denies any gross hematuria or dysuria.\par Has not had any episodes of kidney stones or flank pain since last visit.\par \par Has strong libido. In the past patient has tried Cialis and Viagra and no result and patient was getting headache. PSA 0.6 ng/ml , % Free PSA 41.7 % on 10/30/2018. \par \par 9/5/2018\par -Testosterone 440 ([id number]).

## 2020-03-02 NOTE — HISTORY OF PRESENT ILLNESS
[FreeTextEntry1] : ALVAREZ BARRIOS is a 58 year old male with a past medical history of Nephrolithiasis and LUTS and following Dr. Sun. Presents to the office today for ED x 2 years and worsening over time. Patient is happily  x 32 years. Patient states that without trimix he is bale to get a weak erection and unable to sustain longer than one minute. Unable to penetrate x 3 months. No issues with ejaculation. Intermittent morning erections. Denies trauma to genital area. Upon self stimulation, patient states erection is weak. \par \par prescribed trimix last visit and he is very happy with 0.2ml of trimix has great results with 0.2ml but frustrated with loss of spontaneity \par \par His lower urinary tract symptoms including nocturia also improved with behavioral management. He denies any gross hematuria or dysuria.\par Has not had any episodes of kidney stones or flank pain since last visit.\par \par Has strong libido. In the past patient has tried Cialis and Viagra and no result and patient was getting headache. PSA 0.6 ng/ml , % Free PSA 41.7 % on 10/30/2018. \par \par 9/5/2018\par -Testosterone 440 ([id number]).

## 2020-03-02 NOTE — PHYSICAL EXAM
[General Appearance - Well Developed] : well developed [Well Groomed] : well groomed [General Appearance - Well Nourished] : well nourished [Normal Appearance] : normal appearance [General Appearance - In No Acute Distress] : no acute distress [Abdomen Soft] : soft [Abdomen Tenderness] : non-tender [Costovertebral Angle Tenderness] : no ~M costovertebral angle tenderness [Skin Color & Pigmentation] : normal skin color and pigmentation [Edema] : no peripheral edema [Respiration, Rhythm And Depth] : normal respiratory rhythm and effort [] : no respiratory distress [Oriented To Time, Place, And Person] : oriented to person, place, and time [Affect] : the affect was normal [Exaggerated Use Of Accessory Muscles For Inspiration] : no accessory muscle use [Mood] : the mood was normal [Not Anxious] : not anxious [Normal Station and Gait] : the gait and station were normal for the patient's age [No Focal Deficits] : no focal deficits

## 2020-09-16 ENCOUNTER — APPOINTMENT (OUTPATIENT)
Dept: UROLOGY | Facility: CLINIC | Age: 60
End: 2020-09-16
Payer: COMMERCIAL

## 2020-09-16 VITALS
SYSTOLIC BLOOD PRESSURE: 130 MMHG | WEIGHT: 200 LBS | DIASTOLIC BLOOD PRESSURE: 70 MMHG | HEIGHT: 68 IN | BODY MASS INDEX: 30.31 KG/M2 | HEART RATE: 58 BPM

## 2020-09-16 PROCEDURE — 99213 OFFICE O/P EST LOW 20 MIN: CPT

## 2020-09-16 NOTE — ASSESSMENT
[FreeTextEntry1] : 60 year old with a history of Nephrolithiasis and LUTS and following Dr. Sun. Hew has ED x 2 years and worsening over time. Currently on Trimix 0.2 ml and states that without trimix he is able  to get a weak erection and unable to sustain longer than one minute. No issues with ejaculation. Intermittent morning erections.Strong libido. No issues with urination, voiding with a good flow. Has tried Cialis and Sildenafil in the past with no good results.\par \par 8/2020  PSA 0.6 ng/ml  / Testosterone 562.3 (264-916)\par 10/2018  PSA 0.6 ng/ml , 41 % free PSA

## 2020-09-16 NOTE — PHYSICAL EXAM
[General Appearance - Well Nourished] : well nourished [General Appearance - Well Developed] : well developed [Normal Appearance] : normal appearance [Well Groomed] : well groomed [General Appearance - In No Acute Distress] : no acute distress [Abdomen Soft] : soft [Abdomen Tenderness] : non-tender [Costovertebral Angle Tenderness] : no ~M costovertebral angle tenderness [Edema] : no peripheral edema [] : no respiratory distress [Respiration, Rhythm And Depth] : normal respiratory rhythm and effort [Exaggerated Use Of Accessory Muscles For Inspiration] : no accessory muscle use [Oriented To Time, Place, And Person] : oriented to person, place, and time [Affect] : the affect was normal [Mood] : the mood was normal [Not Anxious] : not anxious [Normal Station and Gait] : the gait and station were normal for the patient's age [No Focal Deficits] : no focal deficits

## 2020-09-16 NOTE — HISTORY OF PRESENT ILLNESS
[Currently Experiencing ___] :  [unfilled] [Erectile Dysfunction] : Erectile Dysfunction [None] : None [FreeTextEntry1] : 60 year old with a history of Nephrolithiasis and LUTS and following Dr. Sun. Hew has ED x 2 years and worsening over time. Currently on Trimix 0.2 ml and states that without trimix he is able  to get a weak erection and unable to sustain longer than one minute. No issues with ejaculation. Intermittent morning erections.Strong libido. No issues with urination, voiding with a good flow. Has tried Cialis and Sildenafil in the past with no good results.\par \par 8/2020  PSA 0.6 ng/ml  / Testosterone 562.3 (264-916)\par 10/2018  PSA 0.6 ng/ml , 41 % free PSA

## 2020-11-16 ENCOUNTER — APPOINTMENT (OUTPATIENT)
Dept: UROLOGY | Facility: CLINIC | Age: 60
End: 2020-11-16

## 2021-01-11 NOTE — PHYSICAL EXAM
[Normal Breath Sounds] : Normal breath sounds [Normal Heart Sounds] : normal heart sounds [Abdomen Tenderness] : ~T ~M No abdominal tenderness [Abdominal Masses] : No abdominal masses [Alert] : alert [Calm] : calm [de-identified] : well-healing vertical umbilical incision without erythema, induration or drainage; non-tender, +rectus diasthasis [de-identified] : no acute distress [de-identified] : full ROM x 4 [de-identified] : left upper back fluid-filled swelling underneath a well-healing skin incision with open 1cm inferior aspect; +tenderness, no overlying skin changes, no expressible drainage 5

## 2021-05-26 ENCOUNTER — EMERGENCY (EMERGENCY)
Facility: HOSPITAL | Age: 61
LOS: 0 days | Discharge: HOME | End: 2021-05-26
Attending: EMERGENCY MEDICINE | Admitting: EMERGENCY MEDICINE
Payer: MEDICARE

## 2021-05-26 VITALS
OXYGEN SATURATION: 99 % | DIASTOLIC BLOOD PRESSURE: 75 MMHG | WEIGHT: 199.96 LBS | TEMPERATURE: 99 F | HEIGHT: 68 IN | SYSTOLIC BLOOD PRESSURE: 134 MMHG | HEART RATE: 80 BPM | RESPIRATION RATE: 20 BRPM

## 2021-05-26 VITALS
DIASTOLIC BLOOD PRESSURE: 70 MMHG | HEART RATE: 60 BPM | SYSTOLIC BLOOD PRESSURE: 142 MMHG | RESPIRATION RATE: 18 BRPM | OXYGEN SATURATION: 99 %

## 2021-05-26 DIAGNOSIS — Z90.89 ACQUIRED ABSENCE OF OTHER ORGANS: Chronic | ICD-10-CM

## 2021-05-26 DIAGNOSIS — K42.9 UMBILICAL HERNIA WITHOUT OBSTRUCTION OR GANGRENE: Chronic | ICD-10-CM
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
## 2021-05-26 DIAGNOSIS — Z94.84 STEM CELLS TRANSPLANT STATUS: Chronic | ICD-10-CM

## 2021-05-26 DIAGNOSIS — M25.571 PAIN IN RIGHT ANKLE AND JOINTS OF RIGHT FOOT: ICD-10-CM

## 2021-05-26 DIAGNOSIS — Z98.890 OTHER SPECIFIED POSTPROCEDURAL STATES: Chronic | ICD-10-CM

## 2021-05-26 DIAGNOSIS — R19.09 OTHER INTRA-ABDOMINAL AND PELVIC SWELLING, MASS AND LUMP: Chronic | ICD-10-CM

## 2021-05-26 DIAGNOSIS — Z99.89 DEPENDENCE ON OTHER ENABLING MACHINES AND DEVICES: ICD-10-CM

## 2021-05-26 DIAGNOSIS — F32.9 MAJOR DEPRESSIVE DISORDER, SINGLE EPISODE, UNSPECIFIED: ICD-10-CM

## 2021-05-26 DIAGNOSIS — G47.33 OBSTRUCTIVE SLEEP APNEA (ADULT) (PEDIATRIC): ICD-10-CM

## 2021-05-26 DIAGNOSIS — S90.01XA CONTUSION OF RIGHT ANKLE, INITIAL ENCOUNTER: ICD-10-CM
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
## 2021-05-26 PROCEDURE — 99283 EMERGENCY DEPT VISIT LOW MDM: CPT | Mod: 25

## 2021-05-26 PROCEDURE — 73610 X-RAY EXAM OF ANKLE: CPT | Mod: 26,RT

## 2021-05-26 PROCEDURE — 73630 X-RAY EXAM OF FOOT: CPT | Mod: 26,RT

## 2021-05-26 PROCEDURE — 29515 APPLICATION SHORT LEG SPLINT: CPT
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
## 2021-05-26 NOTE — ED PROVIDER NOTE - NS ED ROS FT
Review of Systems:  	•	CONSTITUTIONAL - no fever, no diaphoresis, no chills  	•	SKIN - no rash  	•	HEMATOLOGIC - no bleeding, no bruising  	•	EYES - no eye pain, no blurry vision  	•	ENT - no change in hearing, no sore throat, no ear pain or tinnitus  	•	RESPIRATORY - no shortness of breath, no cough  	•	CARDIAC - no chest pain, no palpitations  	•	GI - no abd pain, no nausea, no vomiting, no diarrhea, no constipation  	•	GENITO-URINARY - no discharge, no dysuria; no hematuria, no increased urinary frequency  	•	MUSCULOSKELETAL -right ankle pain   	•	NEUROLOGIC - no weakness, no headache, no paresthesias, no LOC  	•	PSYCH - no anxiety, non suicidal, non homicidal, no hallucination, no depression
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
## 2021-05-26 NOTE — ED PROVIDER NOTE - PHYSICAL EXAMINATION
--EXAM--  VITAL SIGNS: I have reviewed vs documented at present.  CONSTITUTIONAL: Well-developed; well-nourished; in no acute distress.   SKIN: Warm and dry, no acute rash.   HEAD: Normocephalic; atraumatic.  EYES: PERRL, EOM intact; conjunctiva and sclera clear. No nystagmus.  ENT: No nasal discharge; airway clear.  NECK: Supple; non tender.  CARD: S1, S2, Regular rate and rhythm.   RESP: No wheezes, rales or rhonchi.  ABD: Normal bowel sounds; soft; non-distended; non-tender.  EXT: right ankle full rom no swelling no eccymosis no tenderness   NEURO: Alert, oriented, grossly unremarkable. Strength 5/5 in all extremities. Sensation intact throughout.  PSYCH: Cooperative, appropriate.

## 2021-05-26 NOTE — ED PROVIDER NOTE - PATIENT PORTAL LINK FT
You can access the FollowMyHealth Patient Portal offered by Health system by registering at the following website: [email or web address]. By joining [facility]’s FollowMyHealth portal, you will also be able to view your health information using other applications (apps) compatible with our system.

## 2021-08-03 ENCOUNTER — NON-APPOINTMENT (OUTPATIENT)
Age: 61
End: 2021-08-03

## 2021-08-03 ENCOUNTER — APPOINTMENT (OUTPATIENT)
Dept: PULMONOLOGY | Facility: CLINIC | Age: 61
End: 2021-08-03
Payer: MEDICARE

## 2021-08-03 VITALS
WEIGHT: 200 LBS | DIASTOLIC BLOOD PRESSURE: 70 MMHG | OXYGEN SATURATION: 98 % | SYSTOLIC BLOOD PRESSURE: 112 MMHG | BODY MASS INDEX: 30.31 KG/M2 | HEART RATE: 70 BPM | HEIGHT: 68 IN | RESPIRATION RATE: 14 BRPM

## 2021-08-03 PROCEDURE — 99212 OFFICE O/P EST SF 10 MIN: CPT

## 2021-08-15 ENCOUNTER — RESULT REVIEW (OUTPATIENT)
Age: 61
End: 2021-08-15

## 2021-08-15 ENCOUNTER — OUTPATIENT (OUTPATIENT)
Dept: OUTPATIENT SERVICES | Facility: HOSPITAL | Age: 61
LOS: 1 days | Discharge: HOME | End: 2021-08-15
Payer: MEDICARE

## 2021-08-15 DIAGNOSIS — Z98.890 OTHER SPECIFIED POSTPROCEDURAL STATES: Chronic | ICD-10-CM

## 2021-08-15 DIAGNOSIS — K42.9 UMBILICAL HERNIA WITHOUT OBSTRUCTION OR GANGRENE: Chronic | ICD-10-CM

## 2021-08-15 DIAGNOSIS — R19.09 OTHER INTRA-ABDOMINAL AND PELVIC SWELLING, MASS AND LUMP: Chronic | ICD-10-CM

## 2021-08-15 DIAGNOSIS — Z94.84 STEM CELLS TRANSPLANT STATUS: Chronic | ICD-10-CM

## 2021-08-15 DIAGNOSIS — Z90.89 ACQUIRED ABSENCE OF OTHER ORGANS: Chronic | ICD-10-CM

## 2021-08-15 DIAGNOSIS — R91.8 OTHER NONSPECIFIC ABNORMAL FINDING OF LUNG FIELD: ICD-10-CM

## 2021-08-15 PROCEDURE — 71250 CT THORAX DX C-: CPT | Mod: 26,MH

## 2021-09-17 ENCOUNTER — APPOINTMENT (OUTPATIENT)
Dept: UROLOGY | Facility: CLINIC | Age: 61
End: 2021-09-17
Payer: MEDICARE

## 2021-09-17 DIAGNOSIS — Z80.0 FAMILY HISTORY OF MALIGNANT NEOPLASM OF DIGESTIVE ORGANS: ICD-10-CM

## 2021-09-17 DIAGNOSIS — N20.0 CALCULUS OF KIDNEY: ICD-10-CM

## 2021-09-17 DIAGNOSIS — R35.1 NOCTURIA: ICD-10-CM

## 2021-09-17 PROCEDURE — 99213 OFFICE O/P EST LOW 20 MIN: CPT

## 2021-09-17 NOTE — PHYSICAL EXAM
[Well Groomed] : well groomed [General Appearance - In No Acute Distress] : no acute distress [] : no respiratory distress [Oriented To Time, Place, And Person] : oriented to person, place, and time [Normal Station and Gait] : the gait and station were normal for the patient's age

## 2021-09-17 NOTE — HISTORY OF PRESENT ILLNESS
[FreeTextEntry1] : 61 year old male presenting for ED and BPH. \par Patient ED is managed on TRIMIX #5 0.2 mL. Patient reports good erections without side effects. Patient states that he would like to discuss further ED treatments. Discussed IPP and vacuum device. Patient opts to continue injections. Patient has tried oral medications in past but stopped due to unsatisfactory erections and side effects of headaches. \par Patient does have bothersome LUTS which has improved over the last year as patient has lost weight and is feeling healthier. Denies dysuria and gross hematuria. \par \par 8/2020 PSA 0.6 ng/ml / Testosterone 562.3 (264-916)\par 10/2018 PSA 0.6 ng/ml , 41 % free PSA \par \par No recent PSA to review. Patient reports done by PCP and was normal. Patient will mail report. Patient given PSA script and instructed to get blood work done if no recent PSA. Patient verbalized understanding. \par \par Patient father recently diagnosed with Pancreatic Cancer.

## 2022-06-21 ENCOUNTER — APPOINTMENT (OUTPATIENT)
Dept: OTOLARYNGOLOGY | Facility: CLINIC | Age: 62
End: 2022-06-21
Payer: MEDICARE

## 2022-06-21 VITALS — BODY MASS INDEX: 29.65 KG/M2 | WEIGHT: 195 LBS

## 2022-06-21 PROCEDURE — 31231 NASAL ENDOSCOPY DX: CPT

## 2022-06-21 PROCEDURE — 99204 OFFICE O/P NEW MOD 45 MIN: CPT | Mod: 25

## 2022-06-21 RX ORDER — IPRATROPIUM BROMIDE 42 UG/1
0.06 SPRAY NASAL 3 TIMES DAILY
Qty: 2 | Refills: 6 | Status: ACTIVE | COMMUNITY
Start: 2022-06-21 | End: 1900-01-01

## 2022-06-21 RX ORDER — LEVOCETIRIZINE DIHYDROCHLORIDE 5 MG/1
5 TABLET ORAL DAILY
Qty: 1 | Refills: 3 | Status: ACTIVE | COMMUNITY
Start: 2022-06-21 | End: 1900-01-01

## 2022-06-21 RX ORDER — AZELASTINE HYDROCHLORIDE 205.5 UG/1
0.15 SPRAY, METERED NASAL
Qty: 1 | Refills: 6 | Status: ACTIVE | COMMUNITY
Start: 2022-06-21 | End: 1900-01-01

## 2022-06-21 NOTE — HISTORY OF PRESENT ILLNESS
[de-identified] : Patient presents today c/o rhinitis . Patient states he has been having severe allergic rhinitis.Problem preset since sept 11, 2001.   Patient was in the [facility] recovery since then he has been sneezing and post nasal drip.   He has been diagnosed with esophageal stricture by GI. Referred to our office for further management. Former smoker. Takes cetirizine and azelastine as needed.  daily. Feels problem is worse in am. Pt has previous sinus surgery 2006. Pt has allergy testing on multiple occasions. \par \par Wife is present with pt and providing history.

## 2022-06-21 NOTE — PROCEDURE
[Recalcitrant Symptoms] : recalcitrant symptoms  [None] : none [Flexible Endoscope] : examined with the flexible endoscope [Congested] : congested [Allergic] : allergic signs [Claudia] : claudia [Normal] : the paranasal sinuses had no abnormalities

## 2022-07-20 ENCOUNTER — APPOINTMENT (OUTPATIENT)
Dept: OTOLARYNGOLOGY | Facility: CLINIC | Age: 62
End: 2022-07-20

## 2022-07-20 PROCEDURE — 99214 OFFICE O/P EST MOD 30 MIN: CPT | Mod: 25

## 2022-07-20 PROCEDURE — 31231 NASAL ENDOSCOPY DX: CPT

## 2022-08-03 ENCOUNTER — APPOINTMENT (OUTPATIENT)
Age: 62
End: 2022-08-03

## 2022-08-03 VITALS
WEIGHT: 195 LBS | BODY MASS INDEX: 29.55 KG/M2 | HEIGHT: 68 IN | SYSTOLIC BLOOD PRESSURE: 118 MMHG | HEART RATE: 74 BPM | OXYGEN SATURATION: 98 % | DIASTOLIC BLOOD PRESSURE: 76 MMHG | RESPIRATION RATE: 14 BRPM

## 2022-08-03 PROCEDURE — 99213 OFFICE O/P EST LOW 20 MIN: CPT

## 2022-08-03 NOTE — DISCUSSION/SUMMARY
[FreeTextEntry1] : MULTIPLE LUNG DISEASE/ 9 11 EXPOSURE CHEST CT NEXT YEAR\par JOSE COMPLIANT TO GET NEW MACHINE

## 2022-08-22 RX ORDER — SILDENAFIL 100 MG/1
100 TABLET, FILM COATED ORAL
Qty: 10 | Refills: 0 | Status: ACTIVE | COMMUNITY
Start: 2022-02-14 | End: 1900-01-01

## 2022-09-19 ENCOUNTER — APPOINTMENT (OUTPATIENT)
Dept: UROLOGY | Facility: CLINIC | Age: 62
End: 2022-09-19

## 2022-09-28 ENCOUNTER — RX RENEWAL (OUTPATIENT)
Age: 62
End: 2022-09-28

## 2022-12-16 ENCOUNTER — RX RENEWAL (OUTPATIENT)
Age: 62
End: 2022-12-16

## 2023-01-11 ENCOUNTER — RX RENEWAL (OUTPATIENT)
Age: 63
End: 2023-01-11

## 2023-01-11 RX ORDER — PANTOPRAZOLE 40 MG/1
40 TABLET, DELAYED RELEASE ORAL
Qty: 90 | Refills: 0 | Status: ACTIVE | COMMUNITY
Start: 2022-06-21 | End: 1900-01-01

## 2023-01-18 ENCOUNTER — APPOINTMENT (OUTPATIENT)
Dept: UROLOGY | Facility: CLINIC | Age: 63
End: 2023-01-18
Payer: MEDICARE

## 2023-01-18 VITALS
WEIGHT: 195 LBS | HEIGHT: 68 IN | TEMPERATURE: 97.3 F | BODY MASS INDEX: 29.55 KG/M2 | OXYGEN SATURATION: 98 % | RESPIRATION RATE: 16 BRPM | SYSTOLIC BLOOD PRESSURE: 115 MMHG | HEART RATE: 73 BPM | DIASTOLIC BLOOD PRESSURE: 72 MMHG

## 2023-01-18 PROCEDURE — 99214 OFFICE O/P EST MOD 30 MIN: CPT

## 2023-01-18 NOTE — HISTORY OF PRESENT ILLNESS
[FreeTextEntry1] : 62 year old male presenting for ED and BPH.  no longer needing to take flomax. \par Patient ED is managed on TRIMIX #5 0.2 mL. Patient reports good erections without side effects. Patient states that he would like to discuss further ED treatments. Discussed IPP and vacuum device. Patient opts to continue injections. Patient has tried oral medications in past but stopped due to unsatisfactory erections and side effects of headaches. \par Patient does have bothersome LUTS which has improved over the last year as patient has lost weight and is feeling healthier. Denies dysuria and gross hematuria. \par \par 8/2020 PSA 0.6 ng/ml / Testosterone 562.3 (264-916)\par 10/2018 PSA 0.6 ng/ml , 41 % free PSA \par \par No recent PSA to review. Patient reports done by PCP and was normal. Patient will mail report. Patient given PSA script and instructed to get blood work done if no recent PSA. Patient verbalized understanding. \par \par Patient father recently diagnosed with Pancreatic Cancer. \par \par PSA Profile - Total - not yet done

## 2023-01-31 RX ORDER — SILDENAFIL CITRATE 100 MG/1
100 TABLET, FILM COATED ORAL
Qty: 2 | Refills: 0 | Status: ACTIVE | COMMUNITY
Start: 2023-01-18 | End: 1900-01-01

## 2023-03-10 NOTE — ASU PREOP CHECKLIST - HAND OFF
[Abdomen Masses] : No abdominal masses [Abdomen Tenderness] : ~T No ~M abdominal tenderness [No HSM] : no hepatosplenomegaly [JVD] : no jugular venous distention  [Normal Breath Sounds] : Normal breath sounds [No Rash or Lesion] : No rash or lesion [Alert] : alert [Calm] : calm [de-identified] : Abdomen is soft, nontender, nondistended. Incisions are well healed. No hernia or masses\par  yes

## 2023-03-15 ENCOUNTER — APPOINTMENT (OUTPATIENT)
Dept: UROLOGY | Facility: CLINIC | Age: 63
End: 2023-03-15
Payer: MEDICARE

## 2023-03-15 VITALS
RESPIRATION RATE: 16 BRPM | HEIGHT: 68 IN | TEMPERATURE: 97.4 F | SYSTOLIC BLOOD PRESSURE: 112 MMHG | DIASTOLIC BLOOD PRESSURE: 73 MMHG | WEIGHT: 195 LBS | BODY MASS INDEX: 29.55 KG/M2 | HEART RATE: 74 BPM | OXYGEN SATURATION: 98 %

## 2023-03-15 DIAGNOSIS — R39.9 UNSPECIFIED SYMPTOMS AND SIGNS INVOLVING THE GENITOURINARY SYSTEM: ICD-10-CM

## 2023-03-15 LAB
PSA FREE FLD-MCNC: 29 %
PSA FREE SERPL-MCNC: 0.25 NG/ML
PSA SERPL-MCNC: 0.88 NG/ML

## 2023-03-15 PROCEDURE — 99213 OFFICE O/P EST LOW 20 MIN: CPT

## 2023-03-15 NOTE — HISTORY OF PRESENT ILLNESS
[FreeTextEntry1] : 62 year old male presenting for ED and BPH. no longer needing to take flomax. \par Patient ED is managed on TRIMIX #5 0.2 mL. Patient reports good erections without side effects. Patient states that he would like to discuss further ED treatments. Discussed IPP and vacuum device. Patient opts to continue injections. Patient has tried oral medications in past but stopped due to unsatisfactory erections and side effects of headaches. \par Patient does have bothersome LUTS which has improved over the last year as patient has lost weight and is feeling healthier. Denies dysuria and gross hematuria. \par \par 8/2020 PSA 0.6 ng/ml / Testosterone 562.3 (264-916)\par 10/2018 PSA 0.6 ng/ml , 41 % free PSA \par \par No recent PSA to review. Patient reports done by PCP and was normal. Patient will mail report. Patient given PSA script and instructed to get blood work done if no recent PSA. Patient verbalized understanding. \par \par Patient father recently diagnosed with Pancreatic Cancer. \par \par Started trimix last visit but was not able to start it\par \par Jan 2023\par PSA Profile - Total - 0.88 \par \par wants to continue with both sildenafil and trimix -- knows to not use at the same time

## 2023-06-28 ENCOUNTER — APPOINTMENT (OUTPATIENT)
Dept: UROLOGY | Facility: CLINIC | Age: 63
End: 2023-06-28
Payer: MEDICARE

## 2023-06-28 VITALS
RESPIRATION RATE: 18 BRPM | SYSTOLIC BLOOD PRESSURE: 124 MMHG | TEMPERATURE: 97.2 F | OXYGEN SATURATION: 98 % | DIASTOLIC BLOOD PRESSURE: 82 MMHG | HEIGHT: 68 IN | BODY MASS INDEX: 29.55 KG/M2 | HEART RATE: 59 BPM | WEIGHT: 195 LBS

## 2023-06-28 PROCEDURE — 99213 OFFICE O/P EST LOW 20 MIN: CPT

## 2023-06-28 NOTE — ASSESSMENT
[FreeTextEntry1] : 62 year old male presenting for ED and BPH. no longer needing to take flomax. \par Patient ED is managed on TRIMIX #5 0.2 mL. Patient reports good erections without side effects. Patient states that he would like to discuss further ED treatments. Discussed IPP and vacuum device. Patient opts to continue injections. Patient has tried oral medications in past but stopped due to unsatisfactory erections and side effects of headaches. \par Patient does have bothersome LUTS which has improved over the last year as patient has lost weight and is feeling healthier. Denies dysuria and gross hematuria. \par \par 8/2020 PSA 0.6 ng/ml / Testosterone 562.3 (264-916)\par 10/2018 PSA 0.6 ng/ml , 41 % free PSA \par \par No recent PSA to review. Patient reports done by PCP and was normal. Patient will mail report. Patient given PSA script and instructed to get blood work done if no recent PSA. Patient verbalized understanding. \par \par Patient father recently diagnosed with Pancreatic Cancer. \par \par Started trimix  -- very happy with 20 units -- good firmness -- not for longer than an hour \par \par Jan 2023\par PSA Profile - Total - 0.88 \par \par wants to continue with both sildenafil and trimix -- knows to not use at the same time. \par

## 2023-06-28 NOTE — HISTORY OF PRESENT ILLNESS
[FreeTextEntry1] : 62 year old male presenting for ED and BPH. no longer needing to take flomax. \par Patient ED is managed on TRIMIX #5 0.2 mL. Patient reports good erections without side effects. Patient states that he would like to discuss further ED treatments. Discussed IPP and vacuum device. Patient opts to continue injections. Patient has tried oral medications in past but stopped due to unsatisfactory erections and side effects of headaches. \par Patient does have bothersome LUTS which has improved over the last year as patient has lost weight and is feeling healthier. Denies dysuria and gross hematuria. \par \par 8/2020 PSA 0.6 ng/ml / Testosterone 562.3 (264-916)\par 10/2018 PSA 0.6 ng/ml , 41 % free PSA \par \par No recent PSA to review. Patient reports done by PCP and was normal. Patient will mail report. Patient given PSA script and instructed to get blood work done if no recent PSA. Patient verbalized understanding. \par \par Patient father recently diagnosed with Pancreatic Cancer. \par \par Started trimix  -- very happy with 20 units -- good firmness -- not for longer than an hour \par \par Jan 2023\par PSA Profile - Total - 0.88 \par \par wants to continue with both sildenafil and trimix -- knows to not use at the same time. \par \par

## 2023-08-02 ENCOUNTER — OUTPATIENT (OUTPATIENT)
Dept: OUTPATIENT SERVICES | Facility: HOSPITAL | Age: 63
LOS: 1 days | End: 2023-08-02
Payer: MEDICARE

## 2023-08-02 ENCOUNTER — RESULT REVIEW (OUTPATIENT)
Age: 63
End: 2023-08-02

## 2023-08-02 DIAGNOSIS — R91.1 SOLITARY PULMONARY NODULE: ICD-10-CM

## 2023-08-02 DIAGNOSIS — Z00.8 ENCOUNTER FOR OTHER GENERAL EXAMINATION: ICD-10-CM

## 2023-08-02 DIAGNOSIS — R19.09 OTHER INTRA-ABDOMINAL AND PELVIC SWELLING, MASS AND LUMP: Chronic | ICD-10-CM

## 2023-08-02 DIAGNOSIS — Z98.890 OTHER SPECIFIED POSTPROCEDURAL STATES: Chronic | ICD-10-CM

## 2023-08-02 DIAGNOSIS — Z90.89 ACQUIRED ABSENCE OF OTHER ORGANS: Chronic | ICD-10-CM

## 2023-08-02 DIAGNOSIS — Z94.84 STEM CELLS TRANSPLANT STATUS: Chronic | ICD-10-CM

## 2023-08-02 DIAGNOSIS — K42.9 UMBILICAL HERNIA WITHOUT OBSTRUCTION OR GANGRENE: Chronic | ICD-10-CM

## 2023-08-02 PROCEDURE — 71250 CT THORAX DX C-: CPT

## 2023-08-02 PROCEDURE — 71250 CT THORAX DX C-: CPT | Mod: 26,MH

## 2023-08-03 DIAGNOSIS — R91.1 SOLITARY PULMONARY NODULE: ICD-10-CM

## 2023-09-25 ENCOUNTER — APPOINTMENT (OUTPATIENT)
Dept: PULMONOLOGY | Facility: CLINIC | Age: 63
End: 2023-09-25
Payer: MEDICARE

## 2023-09-25 VITALS
SYSTOLIC BLOOD PRESSURE: 148 MMHG | DIASTOLIC BLOOD PRESSURE: 80 MMHG | BODY MASS INDEX: 29.55 KG/M2 | HEIGHT: 68 IN | OXYGEN SATURATION: 97 % | WEIGHT: 195 LBS | HEART RATE: 87 BPM

## 2023-09-25 PROCEDURE — 99213 OFFICE O/P EST LOW 20 MIN: CPT

## 2023-11-15 ENCOUNTER — RESULT REVIEW (OUTPATIENT)
Age: 63
End: 2023-11-15

## 2023-11-15 ENCOUNTER — OUTPATIENT (OUTPATIENT)
Dept: OUTPATIENT SERVICES | Facility: HOSPITAL | Age: 63
LOS: 1 days | End: 2023-11-15
Payer: MEDICARE

## 2023-11-15 DIAGNOSIS — K42.9 UMBILICAL HERNIA WITHOUT OBSTRUCTION OR GANGRENE: Chronic | ICD-10-CM

## 2023-11-15 DIAGNOSIS — Z90.89 ACQUIRED ABSENCE OF OTHER ORGANS: Chronic | ICD-10-CM

## 2023-11-15 DIAGNOSIS — R19.09 OTHER INTRA-ABDOMINAL AND PELVIC SWELLING, MASS AND LUMP: Chronic | ICD-10-CM

## 2023-11-15 DIAGNOSIS — R91.1 SOLITARY PULMONARY NODULE: ICD-10-CM

## 2023-11-15 DIAGNOSIS — Z94.84 STEM CELLS TRANSPLANT STATUS: Chronic | ICD-10-CM

## 2023-11-15 DIAGNOSIS — Z98.890 OTHER SPECIFIED POSTPROCEDURAL STATES: Chronic | ICD-10-CM

## 2023-11-15 DIAGNOSIS — Z00.8 ENCOUNTER FOR OTHER GENERAL EXAMINATION: ICD-10-CM

## 2023-11-15 PROCEDURE — 71250 CT THORAX DX C-: CPT

## 2023-11-15 PROCEDURE — 71250 CT THORAX DX C-: CPT | Mod: 26

## 2023-11-16 DIAGNOSIS — R91.1 SOLITARY PULMONARY NODULE: ICD-10-CM

## 2023-11-20 RX ORDER — SILDENAFIL 100 MG/1
100 TABLET, FILM COATED ORAL
Qty: 15 | Refills: 11 | Status: ACTIVE | COMMUNITY
Start: 2023-01-18 | End: 1900-01-01

## 2023-11-27 ENCOUNTER — APPOINTMENT (OUTPATIENT)
Dept: PULMONOLOGY | Facility: CLINIC | Age: 63
End: 2023-11-27
Payer: MEDICARE

## 2023-11-27 VITALS
OXYGEN SATURATION: 97 % | WEIGHT: 195 LBS | SYSTOLIC BLOOD PRESSURE: 160 MMHG | BODY MASS INDEX: 29.55 KG/M2 | DIASTOLIC BLOOD PRESSURE: 70 MMHG | RESPIRATION RATE: 12 BRPM | HEART RATE: 66 BPM | HEIGHT: 68 IN

## 2023-11-27 DIAGNOSIS — R91.8 OTHER NONSPECIFIC ABNORMAL FINDING OF LUNG FIELD: ICD-10-CM

## 2023-11-27 DIAGNOSIS — G47.30 SLEEP APNEA, UNSPECIFIED: ICD-10-CM

## 2023-11-27 PROCEDURE — 99213 OFFICE O/P EST LOW 20 MIN: CPT

## 2023-12-05 ENCOUNTER — APPOINTMENT (OUTPATIENT)
Dept: CARDIOTHORACIC SURGERY | Facility: CLINIC | Age: 63
End: 2023-12-05

## 2023-12-20 ENCOUNTER — APPOINTMENT (OUTPATIENT)
Dept: UROLOGY | Facility: CLINIC | Age: 63
End: 2023-12-20

## 2024-01-10 ENCOUNTER — APPOINTMENT (OUTPATIENT)
Dept: ORTHOPEDIC SURGERY | Facility: CLINIC | Age: 64
End: 2024-01-10
Payer: MEDICARE

## 2024-01-10 VITALS — BODY MASS INDEX: 29.55 KG/M2 | HEIGHT: 68 IN | WEIGHT: 195 LBS

## 2024-01-10 PROCEDURE — 20550 NJX 1 TENDON SHEATH/LIGAMENT: CPT | Mod: RT

## 2024-01-10 NOTE — PHYSICAL EXAM
[de-identified] : Physical exam of his right index finger: He does have some degenerative changes present on the DIP joint and PIP joint of the index finger.  Swelling is present.  Pain over the A1 pulley.  He cannot make a full fist secondary to the swelling of the digit.  There is active clicking of the digit on exam without any active locking.  Sensory and motor are intact.

## 2024-01-10 NOTE — DISCUSSION/SUMMARY
[de-identified] : Today we discussed treatment options including observation, cortisone injection, and trigger finger release. The risks and benefits of a cortisone injection were explained to the patient. He understands that sometimes there is a need for second injection.  If the second injection fails, he may want to consider a trigger finger release. The patient would like to proceed with the injection. Under sterile technique and with the patient's consent, I injected 1 cc of dexamethasone and 1 cc of 1% lidocaine into the A1 pulley of the right index finger. He tolerated the procedure well. The puncture site was covered with a band aid. He understands that triggering can reoccur after the cortisone injection.  If it reoccurs a 2nd time, we may consider a 2nd cortisone injection. Follow up in 4-6 weeks for repeat evaluation and possibly a second injection.

## 2024-01-10 NOTE — HISTORY OF PRESENT ILLNESS
[de-identified] : Patient is a 63-year-old male here for evaluation of his right index finger.  He has been complaining of pain, swelling, clicking and locking of the digit for the last month or so.  He denies any new or recent trauma.  He does use his hands a lot.

## 2024-03-08 ENCOUNTER — APPOINTMENT (OUTPATIENT)
Dept: ORTHOPEDIC SURGERY | Facility: CLINIC | Age: 64
End: 2024-03-08
Payer: MEDICARE

## 2024-03-08 VITALS — HEIGHT: 68 IN | BODY MASS INDEX: 29.55 KG/M2 | WEIGHT: 195 LBS

## 2024-03-08 PROCEDURE — 20550 NJX 1 TENDON SHEATH/LIGAMENT: CPT | Mod: RT

## 2024-03-08 PROCEDURE — 99213 OFFICE O/P EST LOW 20 MIN: CPT | Mod: 25

## 2024-03-08 NOTE — HISTORY OF PRESENT ILLNESS
[de-identified] : 63-year-old male with a right index finger trigger digit.  Received a cortisone injection on his last visit.  Did not significantly help his symptoms.  And he comes in for the evaluation today.  Still complain about pain discomfort and locking of the index finger

## 2024-03-08 NOTE — PHYSICAL EXAM
[de-identified] : Patient is tender to palpation A1 pulley right index finger.  Dorsal flexion contracture noted.  No erythema ecchymoses or abrasions.  No masses.

## 2024-03-08 NOTE — ASSESSMENT
[FreeTextEntry1] : Patient is a right-sided index finger trigger digit.  Patient received cortisone injection for the right index finger.  He tolerated well.  Will see how the injection does.  If injection does not help him he will consider surgical intervention for trigger finger release

## 2024-03-21 ENCOUNTER — APPOINTMENT (OUTPATIENT)
Dept: OTOLARYNGOLOGY | Facility: CLINIC | Age: 64
End: 2024-03-21
Payer: MEDICARE

## 2024-03-21 VITALS — WEIGHT: 195 LBS | HEIGHT: 68 IN | BODY MASS INDEX: 29.55 KG/M2

## 2024-03-21 PROCEDURE — 99214 OFFICE O/P EST MOD 30 MIN: CPT | Mod: 25

## 2024-03-21 PROCEDURE — 31231 NASAL ENDOSCOPY DX: CPT

## 2024-03-21 NOTE — ASSESSMENT
[FreeTextEntry1] : Risks, benefits, and alternatives of ablation of tissue and turbinate reduction were explained including but not limited to bleeding, infection, persistent symptoms, numbness, perforation, change in smell, change in taste, need for additional surgery, etc...\par

## 2024-03-21 NOTE — REASON FOR VISIT
[Subsequent Evaluation] : a subsequent evaluation for [FreeTextEntry2] : nasal congestion,  PND ,  non-allergic rhinitis , sneezing , itchy nostrils

## 2024-03-21 NOTE — HISTORY OF PRESENT ILLNESS
[FreeTextEntry1] : Patient following up on nasal congestion,  PND ,  non-allergic rhinitis , sneezing , itchy nostrils.  Pt has severe symptoms that are refractory to medical treatment including nasal sprays and allergy meds. Pt has been diagnosed with non allergic rhinitis. Pt has severe symptoms when driving asnd has severe sneezing attacks.  Pt is s/p sinus surgery. wife present.

## 2024-03-21 NOTE — PROCEDURE
[FreeTextEntry6] : The following anatomic sites were directly examined in a sequential fashion: The scope was introduced in the nasal passage between the middle and inferior turbinates to exam the inferior portion of the middle meatus and the fontanelle, as well as the maxillary ostia. Next, the scope was passed medically and posteriorly to the middle turbinates to examine the sphenoethmoid recess and the superior turbinate region. turbinate hypertrophy, swell body, post operative.

## 2024-03-21 NOTE — PHYSICAL EXAM
[Nasal Endoscopy Performed] : nasal endoscopy was performed, see procedure section for findings [de-identified] : edema  [Normal] : lingual tonsils are normal [Midline] : trachea located in midline position

## 2024-03-21 NOTE — ASSESSMENT
[FreeTextEntry1] : Risks, benefits, and alternatives of rhinaer, ablation,  and turbinate reduction were explained including but not limited to bleeding, infection, persistent symptoms, numbness, perforation, change in smell, change in taste, need for additional surgery, etc...

## 2024-03-21 NOTE — PROCEDURE
[Recalcitrant Symptoms] : recalcitrant symptoms  [None] : none [Flexible Endoscope] : examined with the flexible endoscope [Congested] : congested [Claudia] : claudia [Normal] : the paranasal sinuses had no abnormalities [FreeTextEntry6] : The following anatomic sites were directly examined in a sequential fashion: The scope was introduced in the nasal passage between the middle and inferior turbinates to exam the inferior portion of the middle meatus and the fontanelle, as well as the maxillary ostia. Next, the scope was passed medically and posteriorly to the middle turbinates to examine the sphenoethmoid recess and the superior turbinate region. turbinate hypertrophy, swell bodies, post operative.

## 2024-03-21 NOTE — REASON FOR VISIT
[Subsequent Evaluation] : a subsequent evaluation for [FreeTextEntry2] : Chronic rhinitis and nasal congestion.

## 2024-03-21 NOTE — PHYSICAL EXAM
[Normal] : mucosa is normal [Midline] : trachea located in midline position [de-identified] : edema

## 2024-03-21 NOTE — HISTORY OF PRESENT ILLNESS
[FreeTextEntry1] : Patient returns today for follow up Chronic rhinitis and nasal congestion. Patient has no improvement from last visit. Patient started medication. Patient is Having runny nose and itchy nostrils. Pt has been diagnosed with non allergic rhinitis. Pt has severe sneezing and runny nose both in front and back. Pt has failed medical treatment with allergy meds and nasal sprays. Pt had sinus surgery 15 years ago. Wife present.

## 2024-04-03 ENCOUNTER — APPOINTMENT (OUTPATIENT)
Dept: UROLOGY | Facility: CLINIC | Age: 64
End: 2024-04-03
Payer: MEDICARE

## 2024-04-03 VITALS
HEIGHT: 68 IN | HEART RATE: 70 BPM | WEIGHT: 195 LBS | BODY MASS INDEX: 29.55 KG/M2 | SYSTOLIC BLOOD PRESSURE: 118 MMHG | DIASTOLIC BLOOD PRESSURE: 80 MMHG

## 2024-04-03 DIAGNOSIS — N40.1 BENIGN PROSTATIC HYPERPLASIA WITH LOWER URINARY TRACT SYMPMS: ICD-10-CM

## 2024-04-03 DIAGNOSIS — Z12.5 ENCOUNTER FOR SCREENING FOR MALIGNANT NEOPLASM OF PROSTATE: ICD-10-CM

## 2024-04-03 DIAGNOSIS — N52.9 MALE ERECTILE DYSFUNCTION, UNSPECIFIED: ICD-10-CM

## 2024-04-03 PROCEDURE — 99213 OFFICE O/P EST LOW 20 MIN: CPT

## 2024-04-03 PROCEDURE — G2211 COMPLEX E/M VISIT ADD ON: CPT

## 2024-04-03 RX ORDER — PAPAVERINE HYDROCHLORIDE 30 MG/ML
30 INJECTION, SOLUTION INTRAVENOUS
Qty: 10 | Refills: 5 | Status: ACTIVE | COMMUNITY
Start: 2023-01-18 | End: 1900-01-01

## 2024-04-03 NOTE — PLAN
[TextEntry] : doing well continue trimix for ED PSA in one year happy enough with urination -- no meds for bph

## 2024-04-03 NOTE — HISTORY OF PRESENT ILLNESS
[FreeTextEntry1] :  ED and BPH. no longer needing to take flomax.  ED managed on TRIMIX #5 0.2 mL. Patient reports good erections without side effects. Patient states that he would like to discuss further ED treatments. Discussed IPP and vacuum device. Patient opts to continue injections. Patient has tried oral medications in past but stopped due to unsatisfactory erections and side effects of headaches. had abdominal pain with sildenafil   Patient does have bothersome LUTS which has improved over the last year as patient has lost weight and is feeling healthier. Denies dysuria and gross hematuria.  Patient father recently diagnosed with Pancreatic Cancer.  PSA  March 2024 - 1.1 sept 2023- 2.9 Jan 2023- 0.88  8/2020 - 0.6 ng/ml / Testosterone 562.3 (264-916) 10/2018 - 0.6 ng/ml , 41 % free PSA

## 2024-04-09 NOTE — ASU PATIENT PROFILE, ADULT - TEACHING/LEARNING CULTURAL CONSIDERATIONS
71 y/o Female with h/o obesity, HTN, dyslipidemia, Diastolic CHF, A.fib (on coumadin), chronic venous insufficiency, DM type 2, thyroid nodule, ulcerative colitis was admitted for worsening LLE erythema / pain. Patient has a history of severe chronic venous insufficiency with multiple previous admissions for bilateral leg ulcers and cellulitis.  She presents with 2 days of increasing left leg swelling, weeping and pain. She denies fever or chills.  She has been applying ace bandages to both legs for compression but states that she stopped last week because of increased weeping in her legs.     PAST MEDICAL & SURGICAL HISTORY:  Cellulitis  HTN (hypertension)  Thyroid nodule  Peripheral venous insufficiency  Neuropathy  Morbid obesity with BMI of 40.0-44.9, adult  Dyspnea  Congestive heart failure  Atrial fibrillation  Diabetes mellitus type II, controlled  Status post medial meniscus repair  S/P left knee arthroscopy  Other complications of gastric band procedure  H/O colonoscopy  History of esophagogastroduodenoscopy (EGD)    Home Medications:  Ambien 10 mg oral tablet: 1 tab(s) orally once a day (at bedtime) (16 Apr 2019 20:54)  aspirin 81 mg oral tablet: 1 tab(s) orally once a day (16 Apr 2019 20:54)  Coumadin 5 mg oral tablet: 1 tab(s) orally once a day (at bedtime) (16 Apr 2019 20:54)  dilTIAZem 120 mg oral tablet: 1 tab(s) orally 2 times a day (16 Apr 2019 20:54)  furosemide 40 mg oral tablet: 1 tab(s) orally once a day, As Needed (16 Apr 2019 20:54)  gabapentin 300 mg oral capsule: 1 cap(s) orally once a day (16 Apr 2019 20:54)  Lasix 40 mg oral tablet: 1 tab(s) orally once a day (16 Apr 2019 20:54)  metFORMIN 1000 mg oral tablet: 1 tab(s) orally 2 times a day (16 Apr 2019 20:54)  Metoprolol Succinate  mg oral tablet, extended release: 1 tab(s) orally once a day (16 Apr 2019 20:54)  montelukast 10 mg oral tablet: 1 tab(s) orally once a day (at bedtime) (16 Apr 2019 20:54)  oxyCODONE 10 mg oral tablet: 1 tab(s) orally 2 times a day, As Needed - for severe pain (16 Apr 2019 20:54)  potassium chloride 20 mEq oral tablet, extended release: 1 tab(s) orally 2 times a day (16 Apr 2019 20:54)  pravastatin 10 mg oral tablet: 1 tab(s) orally once a day (16 Apr 2019 20:54)  Uceris 9 mg oral tablet, extended release: 1 tab(s) orally once a day (in the morning) (16 Apr 2019 20:54)    Vital Signs Last 24 Hrs  T(C): 37.1 (17 Apr 2019 11:58), Max: 37.6 (16 Apr 2019 17:08)  T(F): 98.7 (17 Apr 2019 11:58), Max: 99.6 (16 Apr 2019 17:08)  HR: 81 (17 Apr 2019 11:58) (81 - 110)  BP: 107/54 (17 Apr 2019 11:58) (107/54 - 168/77)  BP(mean): --  RR: 18 (17 Apr 2019 11:58) (18 - 21)  SpO2: 94% (17 Apr 2019 11:58) (94% - 99%)    Well appearing, NAD  Non labored respiration  Irregular rhythm  Obese, abdomen soft, ND, NT  Left leg with chronic statis dermatitis and lipodermatosclerosis. The is significant tenderness, blanching erythema and blistering of the skin around her medial malleolus due to overlying cellulitis. Right foot with well healing venous ulcer                          12.8   15.89 )-----------( 277      ( 17 Apr 2019 06:01 )             39.0       04-17    140  |  102  |  9   ----------------------------<  136<H>  3.0<L>   |  28  |  0.50    Ca    8.2<L>      17 Apr 2019 06:01  Phos  3.4     04-17  Mg     1.6     04-17    TPro  6.3  /  Alb  2.4<L>  /  TBili  0.9  /  DBili  x   /  AST  38<H>  /  ALT  47  /  AlkPhos  70  04-17 none

## 2024-04-09 NOTE — ASU PATIENT PROFILE, ADULT - BILL OF RIGHTS/ADMISSION INFORMATION PROVIDED TO:
Assessment/Plan:    Recurrent epistaxis  Discussed causes of epistaxis including Eliquis, hypertension, viral illness with nasal congestion, and nasal dryness.      Right nasal packing, placed by ER Sunday/Monday at approx 0100 am Removed 09/12/2023, no notable bleeding. Nasal cease that was inserted yesterday, intact, removed without difficulty. Nasal endoscopy (no procedure) of right side revealed no prominent vessels anteriorly or along the septum or along nasal floor. Noted small area of oozing/secretions along inferior nasal turbinate. This turbinate ir red and inflamed compared to turbinates on the left. Concerned Prominent vessels noted along right inferior nasal turbinate are the site of bleed. Posterior to the inflamed right inferior turbinate there is no evidence of bleeding. May also need to consider posterior bleeding site.      Discussed options including ointments to nose, saline rinses, and nasal cautery. At this point if begin to apply silver nitrate to the turbinate, further irritation will ensue and cautery might be ineffective.       Recommend ointment to nostrils 3 to 4 times daily, saline sprays daily, Afrin for reoccurrence of bleeding, gentle nose blowing, sneeze with mouth open, careful with bending forward. Continue oral antibiotics. May need to consider holding Eliquis and per cardiology notes, may only hold for 48 hours. Note pt has been only taking half of her dose for past couple of days. Follow up in 3 to 4 weeks. Sooner if bleeding reoccurs             Diagnoses and all orders for this visit:    Recurrent epistaxis          Subjective:      Patient ID: Patria Vallejo is a 80 y.o. female. Presents today as a follow up due to nasal concerns. Recurrent nose bleeds. Began Saturday morning, then had another episode late Sunday night. Packing placed in ER. Pt noted blood pressure elevated during episodes. Notes she had not take Metoprolol for about 2 months. Blood thinners, Eliquis daily. Last dose this morning but took half dose. No current Nasal sprays  No history sinus or nasal surgery. Following cardiology for management of Eliquis. History iron deficiency anemia. No prior episodes of nose bleeds    Since yesterday, no bleeding. Blew nose and noticed pink tinge mucus.           The following portions of the patient's history were reviewed and updated as appropriate: allergies, current medications, past family history, past medical history, past social history, past surgical history and problem list.    Review of Systems   Constitutional: Negative. HENT: Positive for nosebleeds. Negative for congestion, ear discharge, ear pain, hearing loss, postnasal drip, rhinorrhea, sinus pressure, sinus pain, sore throat, tinnitus and voice change. Respiratory: Negative for chest tightness and shortness of breath. Skin: Negative for color change. Neurological: Negative for dizziness, numbness and headaches. Psychiatric/Behavioral: Negative. Objective: There were no vitals taken for this visit. Physical Exam  Constitutional:       Appearance: She is well-developed. HENT:      Head: Normocephalic. Right Ear: Hearing, tympanic membrane, ear canal and external ear normal. No decreased hearing noted. No drainage or tenderness. Tympanic membrane is not perforated or erythematous. Left Ear: Hearing, tympanic membrane, ear canal and external ear normal. No decreased hearing noted. No drainage or tenderness. Tympanic membrane is not perforated or erythematous. Nose: Septal deviation and mucosal edema present. No nasal deformity. Right Turbinates: Enlarged and swollen. Mouth/Throat:      Mouth: Mucous membranes are not pale and not dry. No oral lesions. Dentition: Normal dentition. Pharynx: Uvula midline. No oropharyngeal exudate. Neck:      Trachea: No tracheal deviation.    Pulmonary:      Effort: Pulmonary effort is normal. No accessory muscle usage or respiratory distress. Musculoskeletal:      Cervical back: Full passive range of motion without pain and neck supple. Lymphadenopathy:      Cervical: No cervical adenopathy. Skin:     General: Skin is warm and dry. Neurological:      Mental Status: She is alert and oriented to person, place, and time. Cranial Nerves: No cranial nerve deficit. Sensory: No sensory deficit. Psychiatric:         Behavior: Behavior is cooperative. Patient

## 2024-04-10 ENCOUNTER — OUTPATIENT (OUTPATIENT)
Dept: OUTPATIENT SERVICES | Facility: HOSPITAL | Age: 64
LOS: 1 days | Discharge: HOME | End: 2024-04-10
Payer: MEDICARE

## 2024-04-10 ENCOUNTER — APPOINTMENT (OUTPATIENT)
Dept: ORTHOPEDIC SURGERY | Facility: AMBULATORY SURGERY CENTER | Age: 64
End: 2024-04-10

## 2024-04-10 ENCOUNTER — TRANSCRIPTION ENCOUNTER (OUTPATIENT)
Age: 64
End: 2024-04-10

## 2024-04-10 VITALS
SYSTOLIC BLOOD PRESSURE: 155 MMHG | DIASTOLIC BLOOD PRESSURE: 99 MMHG | OXYGEN SATURATION: 99 % | RESPIRATION RATE: 18 BRPM | HEART RATE: 69 BPM

## 2024-04-10 VITALS
RESPIRATION RATE: 18 BRPM | SYSTOLIC BLOOD PRESSURE: 143 MMHG | HEART RATE: 70 BPM | DIASTOLIC BLOOD PRESSURE: 85 MMHG | WEIGHT: 195.11 LBS | TEMPERATURE: 99 F | OXYGEN SATURATION: 99 % | HEIGHT: 68 IN

## 2024-04-10 DIAGNOSIS — K42.9 UMBILICAL HERNIA WITHOUT OBSTRUCTION OR GANGRENE: Chronic | ICD-10-CM

## 2024-04-10 DIAGNOSIS — M65.321 TRIGGER FINGER, RIGHT INDEX FINGER: ICD-10-CM

## 2024-04-10 DIAGNOSIS — Z98.890 OTHER SPECIFIED POSTPROCEDURAL STATES: Chronic | ICD-10-CM

## 2024-04-10 DIAGNOSIS — Z94.84 STEM CELLS TRANSPLANT STATUS: Chronic | ICD-10-CM

## 2024-04-10 DIAGNOSIS — Z90.89 ACQUIRED ABSENCE OF OTHER ORGANS: Chronic | ICD-10-CM

## 2024-04-10 DIAGNOSIS — R19.09 OTHER INTRA-ABDOMINAL AND PELVIC SWELLING, MASS AND LUMP: Chronic | ICD-10-CM

## 2024-04-10 PROCEDURE — 26055 INCISE FINGER TENDON SHEATH: CPT | Mod: F6

## 2024-04-10 RX ORDER — IBUPROFEN 200 MG
1 TABLET ORAL
Qty: 20 | Refills: 0
Start: 2024-04-10

## 2024-04-10 NOTE — ASU DISCHARGE PLAN (ADULT/PEDIATRIC) - ASU DC SPECIAL INSTRUCTIONSFT

## 2024-04-10 NOTE — ASU DISCHARGE PLAN (ADULT/PEDIATRIC) - NS MD DC FALL RISK RISK
For information on Fall & Injury Prevention, visit: [email or web address] OR  [email or web address] OR  https://www.cdc.gov/steadi/patient.html

## 2024-04-17 DIAGNOSIS — M65.321 TRIGGER FINGER, RIGHT INDEX FINGER: ICD-10-CM

## 2024-04-22 ENCOUNTER — APPOINTMENT (OUTPATIENT)
Dept: ORTHOPEDIC SURGERY | Facility: CLINIC | Age: 64
End: 2024-04-22
Payer: MEDICARE

## 2024-04-22 DIAGNOSIS — M65.321 TRIGGER FINGER, RIGHT INDEX FINGER: ICD-10-CM

## 2024-04-22 PROCEDURE — 99024 POSTOP FOLLOW-UP VISIT: CPT

## 2024-04-22 NOTE — PHYSICAL EXAM
[de-identified] : Dressing is removed.  He is nontender at the incision site.  He has no drainage wounds are healed.

## 2024-04-22 NOTE — HISTORY OF PRESENT ILLNESS
[de-identified] : 63-year-old male right index finger trigger digit release in the recent past.  Comes in today for evaluation no complaints of pain discomfort.  He feels as though he is moving well.

## 2024-04-22 NOTE — ASSESSMENT
[FreeTextEntry1] : Patient a right index finger trigger digit.  He underwent release.  He did well.  He will see me back on an as-needed basis.  His sutures were removed today in the office.

## 2024-05-02 ENCOUNTER — APPOINTMENT (OUTPATIENT)
Dept: OTOLARYNGOLOGY | Facility: CLINIC | Age: 64
End: 2024-05-02
Payer: MEDICARE

## 2024-05-02 DIAGNOSIS — R09.81 NASAL CONGESTION: ICD-10-CM

## 2024-05-02 DIAGNOSIS — J34.89 OTHER SPECIFIED DISORDERS OF NOSE AND NASAL SINUSES: ICD-10-CM

## 2024-05-02 DIAGNOSIS — J31.0 CHRONIC RHINITIS: ICD-10-CM

## 2024-05-02 DIAGNOSIS — J34.3 HYPERTROPHY OF NASAL TURBINATES: ICD-10-CM

## 2024-05-02 DIAGNOSIS — J30.89 OTHER ALLERGIC RHINITIS: ICD-10-CM

## 2024-05-02 PROCEDURE — 30802 ABLATE INF TURBINATE SUBMUC: CPT

## 2024-05-02 PROCEDURE — 30930 THER FX NASAL INF TURBINATE: CPT | Mod: LT,59

## 2024-05-02 PROCEDURE — 31242Z: CUSTOM

## 2024-05-02 PROCEDURE — 30469Z: CUSTOM

## 2024-05-02 PROCEDURE — 30117 REMOVAL OF INTRANASAL LESION: CPT | Mod: 59

## 2024-05-07 ENCOUNTER — APPOINTMENT (OUTPATIENT)
Dept: PULMONOLOGY | Facility: CLINIC | Age: 64
End: 2024-05-07

## 2024-05-16 ENCOUNTER — APPOINTMENT (OUTPATIENT)
Dept: OTOLARYNGOLOGY | Facility: CLINIC | Age: 64
End: 2024-05-16
Payer: MEDICARE

## 2024-05-16 DIAGNOSIS — Z98.890 OTHER SPECIFIED POSTPROCEDURAL STATES: ICD-10-CM

## 2024-05-16 PROCEDURE — 99024 POSTOP FOLLOW-UP VISIT: CPT

## 2024-05-16 PROCEDURE — 31237 NSL/SINS NDSC SURG BX POLYPC: CPT | Mod: 50,58

## 2024-05-16 NOTE — PHYSICAL EXAM
[Nasal Endoscopy Performed] : nasal endoscopy was performed, see procedure section for findings [de-identified] : scabbing/ crust

## 2024-05-16 NOTE — PROCEDURE
[Post-Op Patency] : post-op patency [Debridement] : debridement  [Topical Lidocaine] : topical lidocaine [Oxymetazoline HCl] : oxymetazoline HCl [Nasal Mucosa] : bilateral purulence [Bilateral] : bilateral debridement of the nasal cavity [Severe] : severe [David] : on both sides [Removed] : which was removed

## 2024-05-16 NOTE — REASON FOR VISIT
[Subsequent Evaluation] : a subsequent evaluation for [FreeTextEntry2] : nasal congestion, PND , non-allergic rhinitis , sneezing , itchy nostrils.

## 2024-05-16 NOTE — HISTORY OF PRESENT ILLNESS
[FreeTextEntry1] : Patient following up on nasal congestion, PND, non-allergic rhinitis, sneezing, itchy nostrils. He states he feels tenderness in the bridge of the nose in the bridge of his nose. He feels as if he is breathing better  and sleeping better. He feels as if he may have scabs in the nose. No further complaints, Pt has been using saline.

## 2024-08-01 ENCOUNTER — APPOINTMENT (OUTPATIENT)
Dept: OTOLARYNGOLOGY | Facility: CLINIC | Age: 64
End: 2024-08-01
Payer: MEDICARE

## 2024-08-01 DIAGNOSIS — J32.8 OTHER CHRONIC SINUSITIS: ICD-10-CM

## 2024-08-01 DIAGNOSIS — Z98.890 OTHER SPECIFIED POSTPROCEDURAL STATES: ICD-10-CM

## 2024-08-01 DIAGNOSIS — J31.0 CHRONIC RHINITIS: ICD-10-CM

## 2024-08-01 DIAGNOSIS — R09.81 NASAL CONGESTION: ICD-10-CM

## 2024-08-01 DIAGNOSIS — J30.89 OTHER ALLERGIC RHINITIS: ICD-10-CM

## 2024-08-01 PROCEDURE — 99214 OFFICE O/P EST MOD 30 MIN: CPT | Mod: 24,25

## 2024-08-01 PROCEDURE — 31231 NASAL ENDOSCOPY DX: CPT

## 2024-08-01 RX ORDER — FEXOFENADINE HCL AND PSEUDOEPHEDRINE HCI 60; 120 MG/1; MG/1
60-120 TABLET, EXTENDED RELEASE ORAL
Qty: 60 | Refills: 10 | Status: ACTIVE | COMMUNITY
Start: 2024-08-01 | End: 1900-01-01

## 2024-08-01 RX ORDER — DOXYCYCLINE HYCLATE 100 MG/1
100 CAPSULE ORAL
Qty: 42 | Refills: 0 | Status: ACTIVE | COMMUNITY
Start: 2024-08-01 | End: 1900-01-01

## 2024-08-01 NOTE — PROCEDURE
[None] : none [Normal] : the nasal mucosa was normal [FreeTextEntry6] : The following anatomic sites were directly examined in a sequential fashion: The scope was introduced in the nasal passage between the middle and inferior turbinates to exam the inferior portion of the middle meatus and the fontanelle, as well as the maxillary ostia. Next, the scope was passed medically and posteriorly to the middle turbinates to examine the sphenoethmoid recess and the superior turbinate region. [de-identified] : allergic rhinitis

## 2024-08-01 NOTE — HISTORY OF PRESENT ILLNESS
[FreeTextEntry1] : Patient following up on nasal congestion, PND, non-allergic rhinitis, sneezing, itchy nostrils. S/p ablation of inferior turbinate, ablation of nasal swelling, outfracture of inferior turbinate and Rhinear 5/2/24. Reports breathing has improved. Reports allergies are bothering him, having PND and sneezing all the time. States that cetirizine, fluticasone and azelastine without improvement of symptoms. Has been tested for allergies but does not have any.

## 2024-09-12 ENCOUNTER — APPOINTMENT (OUTPATIENT)
Dept: OTOLARYNGOLOGY | Facility: CLINIC | Age: 64
End: 2024-09-12
Payer: MEDICARE

## 2024-09-12 DIAGNOSIS — J31.0 CHRONIC RHINITIS: ICD-10-CM

## 2024-09-12 DIAGNOSIS — J30.89 OTHER ALLERGIC RHINITIS: ICD-10-CM

## 2024-09-12 PROCEDURE — 99214 OFFICE O/P EST MOD 30 MIN: CPT | Mod: 25

## 2024-09-12 PROCEDURE — 31231 NASAL ENDOSCOPY DX: CPT

## 2024-09-12 RX ORDER — FEXOFENADINE HCL 60 MG/1
60 TABLET, FILM COATED ORAL
Qty: 40 | Refills: 3 | Status: ACTIVE | COMMUNITY
Start: 2024-09-12 | End: 1900-01-01

## 2024-09-12 RX ORDER — AZELASTINE HYDROCHLORIDE 137 UG/1
0.1 SPRAY, METERED NASAL DAILY
Qty: 1 | Refills: 10 | Status: ACTIVE | COMMUNITY
Start: 2024-09-12 | End: 1900-01-01

## 2024-09-12 RX ORDER — AMOXICILLIN AND CLAVULANATE POTASSIUM 875; 125 MG/1; MG/1
875-125 TABLET, COATED ORAL
Qty: 28 | Refills: 0 | Status: ACTIVE | COMMUNITY
Start: 2024-09-12 | End: 1900-01-01

## 2024-09-12 RX ORDER — IPRATROPIUM BROMIDE 42 UG/1
0.06 SPRAY NASAL 3 TIMES DAILY
Qty: 2 | Refills: 6 | Status: ACTIVE | COMMUNITY
Start: 2024-09-12 | End: 1900-01-01

## 2024-09-12 NOTE — HISTORY OF PRESENT ILLNESS
[FreeTextEntry1] : Patient returns today for nasal congestion, allergic rhinitis , chronic sinusitis, chronic rhinitis.  S/p sinus surgery.  Patient is breathing much better but still no improvement with his rhinitis. Breathing is much better than before. Continues to have rhinitis on random days. Did not go for CT of sinus. Completed doxycycline with improvement.

## 2024-09-12 NOTE — REASON FOR VISIT
[Subsequent Evaluation] : a subsequent evaluation for [FreeTextEntry2] : nasal congestion, allergic rhinitis , chronic sinusitis , chronic rhinitis

## 2024-09-12 NOTE — PROCEDURE
[Rigid Endoscope] : examined with a rigid endoscope [Congested] : congested [FreeTextEntry6] : The following anatomic sites were directly examined in a sequential fashion: The scope was introduced in the nasal passage between the middle and inferior turbinates to exam the inferior portion of the middle meatus and the fontanelle, as well as the maxillary ostia. Next, the scope was passed medically and posteriorly to the middle turbinates to examine the sphenoethmoid recess and the superior turbinate region. post surgical, erythema, moist.  [de-identified] : rhintis and sinusitis

## 2024-09-27 NOTE — ED ADULT NURSE NOTE - NSFALLRSKHARMRISK_ED_ALL_ED
Diagnosis:   1. Crohn's disease with complication, unspecified gastrointestinal tract location  (CMD)        Patient arrived for infusion today.     Dr. White is the ordering clinician, therapy plan orders reviewed and signed by clinician.     Vital Signs:  ONC OP Encounter Vitals  BP: 137/88  Heart Rate: 94  Resp: 16  Temp: 96.4 °F (35.8 °C)  Temp src: Temporal  SpO2: 98 %  Weight: 83 kg (183 lb)      Allergies:  ALLERGIES:  No Known Allergies      Labs reviewed with the patient: N/A    Nursing Summary:  Patient here at [facility] for Remicade infusion. Patient vitals stable. 22g IV placed in RAC with good blood return and flushing easily. No swelling, pain or redness noted. Educated patient to call if feeling any side effects. Call light within reach. Patient tolerated infusion well with no issues. IV discontinued, guaze and tape applied. Informed patient to follow up with provider with any questions or concerns. Patient discharged in stable, ambulatory condition.          Patient condition stable during treatment? Yes  Questions were answered and understanding was verbalized. Yes   Education provided Yes    Patient advised on follow up, any and all questions answered.  Patient Discharged to home Ambulatory with self  
no

## 2024-10-15 ENCOUNTER — INPATIENT (INPATIENT)
Facility: HOSPITAL | Age: 64
LOS: 1 days | Discharge: HOME | DRG: 312 | End: 2024-10-17
Attending: STUDENT IN AN ORGANIZED HEALTH CARE EDUCATION/TRAINING PROGRAM | Admitting: FAMILY MEDICINE
Payer: MEDICARE

## 2024-10-15 VITALS
OXYGEN SATURATION: 100 % | TEMPERATURE: 97 F | HEART RATE: 60 BPM | SYSTOLIC BLOOD PRESSURE: 161 MMHG | RESPIRATION RATE: 28 BRPM | DIASTOLIC BLOOD PRESSURE: 55 MMHG

## 2024-10-15 DIAGNOSIS — R19.09 OTHER INTRA-ABDOMINAL AND PELVIC SWELLING, MASS AND LUMP: Chronic | ICD-10-CM

## 2024-10-15 DIAGNOSIS — Z98.890 OTHER SPECIFIED POSTPROCEDURAL STATES: Chronic | ICD-10-CM

## 2024-10-15 DIAGNOSIS — Z94.84 STEM CELLS TRANSPLANT STATUS: Chronic | ICD-10-CM

## 2024-10-15 DIAGNOSIS — K42.9 UMBILICAL HERNIA WITHOUT OBSTRUCTION OR GANGRENE: Chronic | ICD-10-CM

## 2024-10-15 DIAGNOSIS — R55 SYNCOPE AND COLLAPSE: ICD-10-CM

## 2024-10-15 DIAGNOSIS — Z90.89 ACQUIRED ABSENCE OF OTHER ORGANS: Chronic | ICD-10-CM

## 2024-10-15 LAB
ALBUMIN SERPL ELPH-MCNC: 4.5 G/DL — SIGNIFICANT CHANGE UP (ref 3.5–5.2)
ALP SERPL-CCNC: 54 U/L — SIGNIFICANT CHANGE UP (ref 30–115)
ALT FLD-CCNC: 76 U/L — HIGH (ref 0–41)
ANION GAP SERPL CALC-SCNC: 18 MMOL/L — HIGH (ref 7–14)
APPEARANCE UR: CLEAR — SIGNIFICANT CHANGE UP
APTT BLD: 31.2 SEC — SIGNIFICANT CHANGE UP (ref 27–39.2)
AST SERPL-CCNC: 78 U/L — HIGH (ref 0–41)
BASE EXCESS BLDV CALC-SCNC: -0.5 MMOL/L — SIGNIFICANT CHANGE UP (ref -2–3)
BASOPHILS # BLD AUTO: 0.08 K/UL — SIGNIFICANT CHANGE UP (ref 0–0.2)
BASOPHILS NFR BLD AUTO: 0.7 % — SIGNIFICANT CHANGE UP (ref 0–1)
BILIRUB SERPL-MCNC: 0.7 MG/DL — SIGNIFICANT CHANGE UP (ref 0.2–1.2)
BILIRUB UR-MCNC: NEGATIVE — SIGNIFICANT CHANGE UP
BUN SERPL-MCNC: 16 MG/DL — SIGNIFICANT CHANGE UP (ref 10–20)
CA-I SERPL-SCNC: 1.12 MMOL/L — LOW (ref 1.15–1.33)
CALCIUM SERPL-MCNC: 10 MG/DL — SIGNIFICANT CHANGE UP (ref 8.4–10.5)
CHLORIDE SERPL-SCNC: 99 MMOL/L — SIGNIFICANT CHANGE UP (ref 98–110)
CO2 SERPL-SCNC: 19 MMOL/L — SIGNIFICANT CHANGE UP (ref 17–32)
COLOR SPEC: YELLOW — SIGNIFICANT CHANGE UP
CREAT SERPL-MCNC: 1.1 MG/DL — SIGNIFICANT CHANGE UP (ref 0.7–1.5)
DIFF PNL FLD: NEGATIVE — SIGNIFICANT CHANGE UP
EGFR: 75 ML/MIN/1.73M2 — SIGNIFICANT CHANGE UP
EOSINOPHIL # BLD AUTO: 0.08 K/UL — SIGNIFICANT CHANGE UP (ref 0–0.7)
EOSINOPHIL NFR BLD AUTO: 0.7 % — SIGNIFICANT CHANGE UP (ref 0–8)
FLUAV AG NPH QL: SIGNIFICANT CHANGE UP
FLUBV AG NPH QL: SIGNIFICANT CHANGE UP
GAS PNL BLDV: 132 MMOL/L — LOW (ref 136–145)
GAS PNL BLDV: SIGNIFICANT CHANGE UP
GLUCOSE SERPL-MCNC: 139 MG/DL — HIGH (ref 70–99)
GLUCOSE UR QL: NEGATIVE MG/DL — SIGNIFICANT CHANGE UP
HCO3 BLDV-SCNC: 20 MMOL/L — LOW (ref 22–29)
HCT VFR BLD CALC: 45.5 % — SIGNIFICANT CHANGE UP (ref 42–52)
HCT VFR BLDA CALC: 49 % — SIGNIFICANT CHANGE UP (ref 39–51)
HGB BLD CALC-MCNC: 16.2 G/DL — SIGNIFICANT CHANGE UP (ref 12.6–17.4)
HGB BLD-MCNC: 15.7 G/DL — SIGNIFICANT CHANGE UP (ref 14–18)
IMM GRANULOCYTES NFR BLD AUTO: 0.7 % — HIGH (ref 0.1–0.3)
INR BLD: 1.06 RATIO — SIGNIFICANT CHANGE UP (ref 0.65–1.3)
KETONES UR-MCNC: 40 MG/DL
LACTATE BLDV-MCNC: 5.7 MMOL/L — CRITICAL HIGH (ref 0.5–2)
LACTATE SERPL-SCNC: 1.6 MMOL/L — SIGNIFICANT CHANGE UP (ref 0.7–2)
LACTATE SERPL-SCNC: 5.5 MMOL/L — CRITICAL HIGH (ref 0.7–2)
LEUKOCYTE ESTERASE UR-ACNC: NEGATIVE — SIGNIFICANT CHANGE UP
LIDOCAIN IGE QN: 27 U/L — SIGNIFICANT CHANGE UP (ref 7–60)
LYMPHOCYTES # BLD AUTO: 1.45 K/UL — SIGNIFICANT CHANGE UP (ref 1.2–3.4)
LYMPHOCYTES # BLD AUTO: 12.2 % — LOW (ref 20.5–51.1)
MCHC RBC-ENTMCNC: 28.3 PG — SIGNIFICANT CHANGE UP (ref 27–31)
MCHC RBC-ENTMCNC: 34.5 G/DL — SIGNIFICANT CHANGE UP (ref 32–37)
MCV RBC AUTO: 82.1 FL — SIGNIFICANT CHANGE UP (ref 80–94)
MONOCYTES # BLD AUTO: 0.79 K/UL — HIGH (ref 0.1–0.6)
MONOCYTES NFR BLD AUTO: 6.7 % — SIGNIFICANT CHANGE UP (ref 1.7–9.3)
NEUTROPHILS # BLD AUTO: 9.36 K/UL — HIGH (ref 1.4–6.5)
NEUTROPHILS NFR BLD AUTO: 79 % — HIGH (ref 42.2–75.2)
NITRITE UR-MCNC: NEGATIVE — SIGNIFICANT CHANGE UP
NRBC # BLD: 0 /100 WBCS — SIGNIFICANT CHANGE UP (ref 0–0)
PCO2 BLDV: 25 MMHG — LOW (ref 42–55)
PH BLDV: 7.52 — HIGH (ref 7.32–7.43)
PH UR: 7.5 — SIGNIFICANT CHANGE UP (ref 5–8)
PLATELET # BLD AUTO: 297 K/UL — SIGNIFICANT CHANGE UP (ref 130–400)
PMV BLD: 11.1 FL — HIGH (ref 7.4–10.4)
PO2 BLDV: 18 MMHG — LOW (ref 25–45)
POTASSIUM BLDV-SCNC: 4.3 MMOL/L — SIGNIFICANT CHANGE UP (ref 3.5–5.1)
POTASSIUM SERPL-MCNC: 4.7 MMOL/L — SIGNIFICANT CHANGE UP (ref 3.5–5)
POTASSIUM SERPL-SCNC: 4.7 MMOL/L — SIGNIFICANT CHANGE UP (ref 3.5–5)
PROT SERPL-MCNC: 7.1 G/DL — SIGNIFICANT CHANGE UP (ref 6–8)
PROT UR-MCNC: NEGATIVE MG/DL — SIGNIFICANT CHANGE UP
PROTHROM AB SERPL-ACNC: 12.1 SEC — SIGNIFICANT CHANGE UP (ref 9.95–12.87)
RBC # BLD: 5.54 M/UL — SIGNIFICANT CHANGE UP (ref 4.7–6.1)
RBC # FLD: 12.7 % — SIGNIFICANT CHANGE UP (ref 11.5–14.5)
RSV RNA NPH QL NAA+NON-PROBE: SIGNIFICANT CHANGE UP
SAO2 % BLDV: 23.8 % — LOW (ref 67–88)
SARS-COV-2 RNA SPEC QL NAA+PROBE: SIGNIFICANT CHANGE UP
SODIUM SERPL-SCNC: 136 MMOL/L — SIGNIFICANT CHANGE UP (ref 135–146)
SP GR SPEC: 1.02 — SIGNIFICANT CHANGE UP (ref 1–1.03)
TROPONIN T, HIGH SENSITIVITY RESULT: 13 NG/L — SIGNIFICANT CHANGE UP (ref 6–21)
TROPONIN T, HIGH SENSITIVITY RESULT: 15 NG/L — SIGNIFICANT CHANGE UP (ref 6–21)
UROBILINOGEN FLD QL: 0.2 MG/DL — SIGNIFICANT CHANGE UP (ref 0.2–1)
WBC # BLD: 11.84 K/UL — HIGH (ref 4.8–10.8)
WBC # FLD AUTO: 11.84 K/UL — HIGH (ref 4.8–10.8)

## 2024-10-15 PROCEDURE — 70553 MRI BRAIN STEM W/O & W/DYE: CPT | Mod: MC

## 2024-10-15 PROCEDURE — A9579: CPT

## 2024-10-15 PROCEDURE — 99222 1ST HOSP IP/OBS MODERATE 55: CPT

## 2024-10-15 PROCEDURE — 70545 MR ANGIOGRAPHY HEAD W/DYE: CPT | Mod: MC

## 2024-10-15 PROCEDURE — 70450 CT HEAD/BRAIN W/O DYE: CPT | Mod: 26,MC

## 2024-10-15 PROCEDURE — 85027 COMPLETE CBC AUTOMATED: CPT

## 2024-10-15 PROCEDURE — 36415 COLL VENOUS BLD VENIPUNCTURE: CPT

## 2024-10-15 PROCEDURE — 93306 TTE W/DOPPLER COMPLETE: CPT

## 2024-10-15 PROCEDURE — 85025 COMPLETE CBC W/AUTO DIFF WBC: CPT

## 2024-10-15 PROCEDURE — 83735 ASSAY OF MAGNESIUM: CPT

## 2024-10-15 PROCEDURE — 70548 MR ANGIOGRAPHY NECK W/DYE: CPT

## 2024-10-15 PROCEDURE — 95819 EEG AWAKE AND ASLEEP: CPT

## 2024-10-15 PROCEDURE — 93010 ELECTROCARDIOGRAM REPORT: CPT | Mod: 77

## 2024-10-15 PROCEDURE — 93005 ELECTROCARDIOGRAM TRACING: CPT

## 2024-10-15 PROCEDURE — 84484 ASSAY OF TROPONIN QUANT: CPT

## 2024-10-15 PROCEDURE — 71045 X-RAY EXAM CHEST 1 VIEW: CPT | Mod: 26

## 2024-10-15 PROCEDURE — 74177 CT ABD & PELVIS W/CONTRAST: CPT | Mod: 26,MC

## 2024-10-15 PROCEDURE — 99285 EMERGENCY DEPT VISIT HI MDM: CPT | Mod: FS

## 2024-10-15 PROCEDURE — 80053 COMPREHEN METABOLIC PANEL: CPT

## 2024-10-15 RX ORDER — 5-HYDROXYTRYPTOPHAN (5-HTP) 100 MG
3 TABLET,DISINTEGRATING ORAL AT BEDTIME
Refills: 0 | Status: DISCONTINUED | OUTPATIENT
Start: 2024-10-15 | End: 2024-10-17

## 2024-10-15 RX ORDER — ONDANSETRON HCL/PF 4 MG/2 ML
4 VIAL (ML) INJECTION ONCE
Refills: 0 | Status: COMPLETED | OUTPATIENT
Start: 2024-10-15 | End: 2024-10-15

## 2024-10-15 RX ORDER — ASPIRIN 325 MG
324 TABLET ORAL ONCE
Refills: 0 | Status: COMPLETED | OUTPATIENT
Start: 2024-10-15 | End: 2024-10-15

## 2024-10-15 RX ORDER — PANTOPRAZOLE SODIUM 40 MG/1
40 TABLET, DELAYED RELEASE ORAL
Refills: 0 | Status: DISCONTINUED | OUTPATIENT
Start: 2024-10-15 | End: 2024-10-17

## 2024-10-15 RX ORDER — CEFEPIME 2 G/1
2000 INJECTION, POWDER, FOR SOLUTION INTRAVENOUS ONCE
Refills: 0 | Status: COMPLETED | OUTPATIENT
Start: 2024-10-15 | End: 2024-10-15

## 2024-10-15 RX ORDER — SODIUM CHLORIDE 0.9 % (FLUSH) 0.9 %
1000 SYRINGE (ML) INJECTION ONCE
Refills: 0 | Status: COMPLETED | OUTPATIENT
Start: 2024-10-15 | End: 2024-10-15

## 2024-10-15 RX ORDER — ONDANSETRON HCL/PF 4 MG/2 ML
4 VIAL (ML) INJECTION EVERY 4 HOURS
Refills: 0 | Status: DISCONTINUED | OUTPATIENT
Start: 2024-10-15 | End: 2024-10-17

## 2024-10-15 RX ORDER — ASPIRIN 325 MG
81 TABLET ORAL DAILY
Refills: 0 | Status: DISCONTINUED | OUTPATIENT
Start: 2024-10-15 | End: 2024-10-17

## 2024-10-15 RX ORDER — MAG HYDROX/ALUMINUM HYD/SIMETH 200-200-20
30 SUSPENSION, ORAL (FINAL DOSE FORM) ORAL EVERY 4 HOURS
Refills: 0 | Status: DISCONTINUED | OUTPATIENT
Start: 2024-10-15 | End: 2024-10-17

## 2024-10-15 RX ORDER — SODIUM CHLORIDE 0.9 % (FLUSH) 0.9 %
1000 SYRINGE (ML) INJECTION
Refills: 0 | Status: DISCONTINUED | OUTPATIENT
Start: 2024-10-15 | End: 2024-10-17

## 2024-10-15 RX ORDER — SODIUM CHLORIDE IRRIG SOLUTION 0.9 %
1000 SOLUTION, IRRIGATION IRRIGATION ONCE
Refills: 0 | Status: COMPLETED | OUTPATIENT
Start: 2024-10-15 | End: 2024-10-15

## 2024-10-15 RX ORDER — ACETAMINOPHEN 325 MG
650 TABLET ORAL EVERY 6 HOURS
Refills: 0 | Status: DISCONTINUED | OUTPATIENT
Start: 2024-10-15 | End: 2024-10-17

## 2024-10-15 RX ADMIN — Medication 75 MILLILITER(S): at 22:56

## 2024-10-15 RX ADMIN — Medication 30 MILLILITER(S): at 22:14

## 2024-10-15 RX ADMIN — Medication 600 MILLIGRAM(S): at 17:42

## 2024-10-15 RX ADMIN — Medication 4 MILLIGRAM(S): at 16:43

## 2024-10-15 RX ADMIN — Medication 600 MILLIGRAM(S): at 17:16

## 2024-10-15 RX ADMIN — Medication 1000 MILLILITER(S): at 17:35

## 2024-10-15 RX ADMIN — Medication 1000 MILLILITER(S): at 20:24

## 2024-10-15 RX ADMIN — CEFEPIME 2000 MILLIGRAM(S): 2 INJECTION, POWDER, FOR SOLUTION INTRAVENOUS at 17:42

## 2024-10-15 RX ADMIN — CEFEPIME 100 MILLIGRAM(S): 2 INJECTION, POWDER, FOR SOLUTION INTRAVENOUS at 16:52

## 2024-10-15 RX ADMIN — Medication 324 MILLIGRAM(S): at 22:13

## 2024-10-15 RX ADMIN — Medication 1000 MILLILITER(S): at 16:43

## 2024-10-15 RX ADMIN — Medication 4 MILLIGRAM(S): at 18:55

## 2024-10-15 NOTE — ED ADULT NURSE NOTE - NSFALLHARMRISKINTERV_ED_ALL_ED
Assistance OOB with selected safe patient handling equipment if applicable/Assistance with ambulation/Communicate risk of Fall with Harm to all staff, patient, and family/Monitor gait and stability/Provide visual cue: red socks, yellow wristband, yellow gown, etc/Reinforce activity limits and safety measures with patient and family/Bed in lowest position, wheels locked, appropriate side rails in place/Call bell, personal items and telephone in reach/Instruct patient to call for assistance before getting out of bed/chair/stretcher/Non-slip footwear applied when patient is off stretcher/[place] to call system/Physically safe environment - no spills, clutter or unnecessary equipment/Purposeful Proactive Rounding/Room/bathroom lighting operational, light cord in reach

## 2024-10-15 NOTE — H&P ADULT - NSHPPHYSICALEXAM_GEN_ALL_CORE
Vital Signs Last 24 Hrs  T(C): 36.2 (15 Oct 2024 15:36), Max: 36.2 (15 Oct 2024 15:36)  T(F): 97.1 (15 Oct 2024 15:36), Max: 97.1 (15 Oct 2024 15:36)  HR: 59 (15 Oct 2024 18:08) (59 - 65)  BP: 151/85 (15 Oct 2024 18:08) (151/85 - 161/55)  BP(mean): --  RR: 20 (15 Oct 2024 17:41) (20 - 28)  SpO2: 100% (15 Oct 2024 17:41) (100% - 100%)    Parameters below as of 15 Oct 2024 15:36  Patient On (Oxygen Delivery Method): room air      GENERAL:  63y/o Male NAD, resting comfortably.  HEAD:  Atraumatic, Normocephalic  EYES: EOMI, PERRLA, conjunctiva and sclera clear  NECK: Supple, No JVD, no cervical lymphadenopathy, non-tender  CHEST/LUNG: Clear to auscultation bilaterally; No wheeze, rhonchi, or rales  HEART: Regular rate and rhythm; S1&S2  ABDOMEN: Soft, Nontender, Nondistended x 4 quadrants; Bowel sounds present  EXTREMITIES:   Peripheral Pulses Present, No clubbing, no cyanosis, or no edema, no calf tenderness  PSYCH: AAOx3, cooperative, appropriate  NEUROLOGY: WNL  SKIN: WNL

## 2024-10-15 NOTE — PATIENT PROFILE ADULT - FALL HARM RISK - HARM RISK INTERVENTIONS

## 2024-10-15 NOTE — ED PROVIDER NOTE - CLINICAL SUMMARY MEDICAL DECISION MAKING FREE TEXT BOX
64-year-old male with no past medical history who presents with chest pressure abdominal pain. Pt states that before presenting to the ED, he developed nausea NBNB vomiting, upper abd pain, chest pain, sob.   ekg no ischemia,  CT no acute pathology  CT AP no acute pathology,   covid negative , trop 15,  repeated  no delt ( 13)  Pt admitted to tele

## 2024-10-15 NOTE — ED PROVIDER NOTE - NSICDXPASTSURGICALHX_GEN_ALL_CORE_FT
PAST SURGICAL HISTORY:  H/O shoulder surgery left    H/O sinus surgery     H/O stem cell transplant bilateral knees    Hernia, umbilical     S/P tonsillectomy     Umbilical mass cyst removed

## 2024-10-15 NOTE — ED ADULT TRIAGE NOTE - MODE OF ARRIVAL
Patient needs DME approval for pump supplies.   DME completed, awaiting approval form insurance company [employer]

## 2024-10-15 NOTE — H&P ADULT - HISTORY OF PRESENT ILLNESS
64-year-old male went to the gym and work out   when started feeling dizzy and chest pain. Pt presenting to the ED, he developed nausea NBNB vomiting, upper abd pain, chest pain, sob. Pt also states that he was feeling chills.  with no past medical history who presents with chest pressure abdominal pain. Pt states that before Pt denies any other medical complaint 64-year-old male went to the gym and work out   when started feeling dizzy and chest pain. the pain was described chest pressure  as 9/10 started after work out and lasted 4 hours. Pt presenting to the ED, he developed nausea NBNB vomiting, upper abd pain, chest pain, sob. Pt also states that he was feeling chills.  with no past medical history who presents with chest pressure abdominal pain. Pt states that before Pt denies any other medical complaint 64-year-old male presents to the ED for evaluation of nausea vomiting.  As per family patient had an episode of syncope while at home.  Family called 911 after this incident.  Patient states he was not feeling well became nauseous sweaty and began vomiting.Patient does admit that while vomiting he did develop some chest discomfort.  Patient also admits that he suffers from anxiety.  Wife attempted to give him his anxiety medicine during this episode but patient thinks he might of just vomited the medicine

## 2024-10-15 NOTE — ED PROVIDER NOTE - PHYSICAL EXAMINATION
--EXAM--  VITAL SIGNS: I have reviewed vs documented at present.  CONSTITUTIONAL: Well-developed; well-nourished; in no acute distress.   SKIN: Warm and dry, no acute rash.   NECK: Supple; non tender.  CARD: S1, S2, Regular rate and rhythm.   RESP: No wheezes, rales or rhonchi.  ABD: Normal bowel sounds; soft; non-distended; non-tender.  EXT: Normal ROM.   NEURO: Alert, oriented, grossly unremarkable. Strength 5/5 in all extremities. Sensation intact throughout.  PSYCH: Cooperative, appropriate.

## 2024-10-15 NOTE — ED PROVIDER NOTE - ATTENDING APP SHARED VISIT CONTRIBUTION OF CARE
64-year-old male with no past medical history who presents with chest pressure abdominal pain. Pt states that before presenting to the ED, he developed nausea NBNB vomiting, upper abd pain, chest pain, sob. Pt also states that he was feeling chills. Pt denies any other medical complaints.     VITAL SIGNS: I have reviewed nursing notes and confirm.  CONSTITUTIONAL: non-toxic, well appearing  SKIN: no rash, no petechiae.  EYES: EOMI, pink conjunctiva, anicteric  ENT: tongue midline, no exudates, MMM  NECK: Supple; no meningismus, no JVD  CARD: RRR, no murmurs, equal radial pulses bilaterally 2+  RESP: CTAB, no respiratory distress  ABD: Soft,  LLQ tenderness, non-distended, no peritoneal signs, no HSM, no CVA tenderness        64 yr old m that presents w/ chest pain/abd pain. labs, ekg, imaging, pain management. reassess. dispo pending.

## 2024-10-15 NOTE — H&P ADULT - ASSESSMENT
64-year-old male went to the gym and work out   when started feeling dizzy and chest pain. Pt presenting to the ED, he developed nausea NBNB vomiting, upper abd pain, chest pain, sob. Pt also states that he was feeling chills.  with no past medical history who presents with chest pressure abdominal pain. Pt states that before Pt denies any other medical complaint.    XD chest pain with syncope:  TELE  AM troponin  cardiology  orthostat bp  TTE  EEG   64-year-old male went to the gym and work out   when started feeling dizzy and chest pain. Pt presenting to the ED, he developed nausea NBNB vomiting, upper abd pain, chest pain, sob. Pt also states that he was feeling chills.  with no past medical history who presents with chest pressure abdominal pain. Pt states that before Pt denies any other medical complaint.    XD chest pain with syncope:  ct abdo /pelvis negative head ct  no ICH no midline shift.  TELE  AM EKG  AM troponin  cardiology  orthostat bp  TTE  EEG    Prophyaxis Gi/VTE    CAse D/W DR Beckford Patient is 64-year-old male with hx of JOSE  and depression non compliance with CPAP presenting with     #Chest pain   #syncope  #Dizziness:  -ct abdo /pelvis negative head ct  no ICH no midline shift.  -EKG shows no acute ischemic changes.  trop negative X2  TELE  AM EKG  AM troponin  cardiology  orthostat bp  TTE  EEG  neurology consult     Prophyaxis Gi/VTE    CAse D/W DR Beckford

## 2024-10-16 ENCOUNTER — RESULT REVIEW (OUTPATIENT)
Age: 64
End: 2024-10-16

## 2024-10-16 LAB
ALBUMIN SERPL ELPH-MCNC: 4.2 G/DL — SIGNIFICANT CHANGE UP (ref 3.5–5.2)
ALP SERPL-CCNC: 50 U/L — SIGNIFICANT CHANGE UP (ref 30–115)
ALT FLD-CCNC: 62 U/L — HIGH (ref 0–41)
ANION GAP SERPL CALC-SCNC: 12 MMOL/L — SIGNIFICANT CHANGE UP (ref 7–14)
AST SERPL-CCNC: 57 U/L — HIGH (ref 0–41)
BILIRUB SERPL-MCNC: 0.6 MG/DL — SIGNIFICANT CHANGE UP (ref 0.2–1.2)
BUN SERPL-MCNC: 14 MG/DL — SIGNIFICANT CHANGE UP (ref 10–20)
CALCIUM SERPL-MCNC: 9.1 MG/DL — SIGNIFICANT CHANGE UP (ref 8.4–10.5)
CHLORIDE SERPL-SCNC: 104 MMOL/L — SIGNIFICANT CHANGE UP (ref 98–110)
CO2 SERPL-SCNC: 23 MMOL/L — SIGNIFICANT CHANGE UP (ref 17–32)
CREAT SERPL-MCNC: 1.2 MG/DL — SIGNIFICANT CHANGE UP (ref 0.7–1.5)
EGFR: 68 ML/MIN/1.73M2 — SIGNIFICANT CHANGE UP
GLUCOSE SERPL-MCNC: 105 MG/DL — HIGH (ref 70–99)
HCT VFR BLD CALC: 42.3 % — SIGNIFICANT CHANGE UP (ref 42–52)
HGB BLD-MCNC: 14.5 G/DL — SIGNIFICANT CHANGE UP (ref 14–18)
MCHC RBC-ENTMCNC: 28.6 PG — SIGNIFICANT CHANGE UP (ref 27–31)
MCHC RBC-ENTMCNC: 34.3 G/DL — SIGNIFICANT CHANGE UP (ref 32–37)
MCV RBC AUTO: 83.4 FL — SIGNIFICANT CHANGE UP (ref 80–94)
NRBC # BLD: 0 /100 WBCS — SIGNIFICANT CHANGE UP (ref 0–0)
PLATELET # BLD AUTO: 293 K/UL — SIGNIFICANT CHANGE UP (ref 130–400)
PMV BLD: 10.8 FL — HIGH (ref 7.4–10.4)
POTASSIUM SERPL-MCNC: 4.5 MMOL/L — SIGNIFICANT CHANGE UP (ref 3.5–5)
POTASSIUM SERPL-SCNC: 4.5 MMOL/L — SIGNIFICANT CHANGE UP (ref 3.5–5)
PROT SERPL-MCNC: 6.4 G/DL — SIGNIFICANT CHANGE UP (ref 6–8)
RBC # BLD: 5.07 M/UL — SIGNIFICANT CHANGE UP (ref 4.7–6.1)
RBC # FLD: 13.1 % — SIGNIFICANT CHANGE UP (ref 11.5–14.5)
SODIUM SERPL-SCNC: 139 MMOL/L — SIGNIFICANT CHANGE UP (ref 135–146)
TROPONIN T, HIGH SENSITIVITY RESULT: 19 NG/L — SIGNIFICANT CHANGE UP (ref 6–21)
WBC # BLD: 12.41 K/UL — HIGH (ref 4.8–10.8)
WBC # FLD AUTO: 12.41 K/UL — HIGH (ref 4.8–10.8)

## 2024-10-16 PROCEDURE — 70545 MR ANGIOGRAPHY HEAD W/DYE: CPT | Mod: 26,XU

## 2024-10-16 PROCEDURE — 70548 MR ANGIOGRAPHY NECK W/DYE: CPT | Mod: 26

## 2024-10-16 PROCEDURE — 99222 1ST HOSP IP/OBS MODERATE 55: CPT

## 2024-10-16 PROCEDURE — 93306 TTE W/DOPPLER COMPLETE: CPT | Mod: 26

## 2024-10-16 PROCEDURE — 99222 1ST HOSP IP/OBS MODERATE 55: CPT | Mod: FS

## 2024-10-16 PROCEDURE — 95816 EEG AWAKE AND DROWSY: CPT | Mod: 26

## 2024-10-16 PROCEDURE — 99232 SBSQ HOSP IP/OBS MODERATE 35: CPT

## 2024-10-16 PROCEDURE — 70553 MRI BRAIN STEM W/O & W/DYE: CPT | Mod: 26

## 2024-10-16 PROCEDURE — 93010 ELECTROCARDIOGRAM REPORT: CPT

## 2024-10-16 RX ADMIN — Medication 4 MILLIGRAM(S): at 00:22

## 2024-10-16 RX ADMIN — Medication 650 MILLIGRAM(S): at 09:45

## 2024-10-16 RX ADMIN — Medication 400 MILLIGRAM(S): at 13:50

## 2024-10-16 RX ADMIN — Medication 650 MILLIGRAM(S): at 21:14

## 2024-10-16 RX ADMIN — Medication 650 MILLIGRAM(S): at 21:44

## 2024-10-16 RX ADMIN — Medication 650 MILLIGRAM(S): at 08:55

## 2024-10-16 RX ADMIN — Medication 3 MILLIGRAM(S): at 00:21

## 2024-10-16 RX ADMIN — Medication 400 MILLIGRAM(S): at 12:47

## 2024-10-16 RX ADMIN — Medication 81 MILLIGRAM(S): at 12:47

## 2024-10-16 RX ADMIN — PANTOPRAZOLE SODIUM 40 MILLIGRAM(S): 40 TABLET, DELAYED RELEASE ORAL at 05:35

## 2024-10-16 RX ADMIN — Medication 5000 UNIT(S): at 05:35

## 2024-10-16 NOTE — EEG REPORT - NS EEG TEXT BOX
[place] Department of Neurology  Inpatient Routine-EEG Report      Patient Name:	ALVAREZ BARRIOS    :	1960  MRN:	-  Study Date/Time:	10/16/2024, 8:52:57 AM  Referred by:	-    Brief Clinical History:  ALVAREZ BARRIOS is a 64 year old Male; study performed to investigate for seizures or markers of epilepsy.   Diagnosis Code: R40.4 Transient alteration of awareness    Patient Medication:  ZOFRAN    HEPARIN    ECOTRIN      Acquisition Details:  Electroencephalography was acquired using a minimum of 21 channels on an [employer] system v 9.3.1 with electrode placement according to the standard International 10-20 system following ACNS (American Clinical Neurophysiology Society) guidelines.  Anterior temporal T1 and T2 electrodes were utilized whenever possible.   The [employer] automated spike & seizure detections were all reviewed in detail, in addition to the entire raw EEG.    Findings:  Background:  continuous.   Voltage:  Normal (20uV)  Organization:  Appropriate anterior-posterior gradient  Posterior Dominant Rhythm:  10 Hz symmetric, well-organized, and well-modulated  Variability:  Yes	Reactivity:  Yes  Sleep:  Absent.  Focal abnormalities:  No persistent asymmetries of voltage or frequency.  Interictal Activity:  None  Focal Slowing:  None  Generalized Slowing:  No  Events:  1)	No electrographic seizures or significant clinical events.  Provocations:  1)	Hyperventilation: was not performed.  2)	Photic stimulation: was not performed.  Impression:  Normal REEG    Clinical Correlation:  Normal study does not exclude diagnosis of seizure disorder    Earline Preciado MD  Attending Neurologist, Division of Epilepsy

## 2024-10-16 NOTE — CONSULT NOTE ADULT - SUBJECTIVE AND OBJECTIVE BOX
CARDIOLOGY CONSULT NOTE     CHIEF COMPLAINT/REASON FOR CONSULT:    HPI:  64-year-old male presents to the ED for evaluation of nausea vomiting.  As per family patient had an episode of syncope while at home.  Family called 911 after this incident.  Patient states he was not feeling well became nauseous sweaty and began vomiting.Patient does admit that while vomiting he did develop some chest discomfort.  Patient also admits that he suffers from anxiety.  Wife attempted to give him his anxiety medicine during this episode but patient thinks he might of just vomited the medicine (15 Oct 2024 20:54)      PAST MEDICAL & SURGICAL HISTORY:  JOSE on CPAP      Depression      H/O shoulder surgery  left      H/O sinus surgery      Hernia, umbilical      Umbilical mass  cyst removed      S/P tonsillectomy      H/O stem cell transplant  bilateral knees          Cardiac Risks:   [ ]HTN, [ ] DM, [ ] Smoking, [ ] FH,  [ ] Lipids        MEDICATIONS:  MEDICATIONS  (STANDING):  aspirin enteric coated 81 milliGRAM(s) Oral daily  heparin   Injectable 5000 Unit(s) SubCutaneous every 12 hours  pantoprazole    Tablet 40 milliGRAM(s) Oral before breakfast  sodium chloride 0.9%. 1000 milliLiter(s) (75 mL/Hr) IV Continuous <Continuous>      FAMILY HISTORY:  FH: cancer        SOCIAL HISTORY:      Allergies    No Known Allergies        	    REVIEW OF SYSTEMS:  CONSTITUTIONAL: No fever, weight loss, or fatigue  EYES: No eye pain, visual disturbances, or discharge  ENMT:  No difficulty hearing, tinnitus, vertigo; No sinus or throat pain  NECK: No pain or stiffness  RESPIRATORY: No cough, wheezing, chills or hemoptysis; No Shortness of Breath  CARDIOVASCULAR: No chest pain, palpitations, passing out, dizziness, or leg swelling  GASTROINTESTINAL: No abdominal or epigastric pain. No nausea, vomiting, or hematemesis; No diarrhea or constipation. No melena or hematochezia.  GENITOURINARY: No dysuria, frequency, hematuria, or incontinence  NEUROLOGICAL: No headaches, memory loss, loss of strength, numbness, or tremors  SKIN: No itching, burning, rashes, or lesions   	      PHYSICAL EXAM:  T(C): 36.9 (10-16-24 @ 04:36), Max: 36.9 (10-16-24 @ 04:36)  HR: 87 (10-16-24 @ 04:36) (59 - 87)  BP: 146/88 (10-16-24 @ 04:36) (146/88 - 161/55)  RR: 18 (10-16-24 @ 04:36) (18 - 28)  SpO2: 97% (10-16-24 @ 04:36) (97% - 100%)  Wt(kg): --  I&O's Summary    15 Oct 2024 07:01  -  16 Oct 2024 07:00  --------------------------------------------------------  IN: 600 mL / OUT: 0 mL / NET: 600 mL        Appearance: Normal	  Psychiatry: A & O x 3, Mood & affect appropriate  HEENT:   Normal oral mucosa, PERRL, EOMI	  Lymphatic: No lymphadenopathy  Cardiovascular: Normal S1 S2,RRR, No JVD, No murmurs  Respiratory: Lungs clear to auscultation	  Gastrointestinal:  Soft, Non-tender, + BS	  Skin: No rashes, No ecchymoses, No cyanosis	  Neurologic: Non-focal  Extremities: Normal range of motion, No clubbing, cyanosis or edema  Vascular: Peripheral pulses palpable 2+ bilaterally      ECG:  	  < from: 12 Lead ECG (10.16.24 @ 07:54) >  Diagnosis Line Normal sinus rhythm  Normal ECG    Confirmed by Rad Whitfield ([id number]) on 10/16/2024 8:12:20 AM    < end of copied text >    	  LABS:	 	    CARDIAC MARKERS:                                    14.5   12.41 )-----------( 293      ( 16 Oct 2024 06:22 )             42.3     10-16    139  |  104  |  14  ----------------------------<  105[H]  4.5   |  23  |  1.2    Ca    9.1      16 Oct 2024 06:22    TPro  6.4  /  Alb  4.2  /  TBili  0.6  /  DBili  x   /  AST  57[H]  /  ALT  62[H]  /  AlkPhos  50  10-16    PT/INR - ( 15 Oct 2024 17:05 )   PT: 12.10 sec;   INR: 1.06 ratio         PTT - ( 15 Oct 2024 17:05 )  PTT:31.2 sec

## 2024-10-16 NOTE — PROGRESS NOTE ADULT - SUBJECTIVE AND OBJECTIVE BOX
ALVAREZ BARRIOS 64y Male  MRN#: [id number]     SUBJECTIVE  Patient is a 64y old Male who presents with a chief complaint of chest pain /syncope (16 Oct 2024 08:41)    Interval/Overnight Events:    Today is hospital day 1d, and this morning he is lying in bed without distress.   No acute overnight events.     OBJECTIVE  PAST MEDICAL & SURGICAL HISTORY  JOSE on CPAP    Depression    H/O shoulder surgery  left    H/O sinus surgery    Hernia, umbilical    Umbilical mass  cyst removed    S/P tonsillectomy    H/O stem cell transplant  bilateral knees      ALLERGIES:  No Known Allergies    MEDICATIONS:  STANDING MEDICATIONS  aspirin enteric coated 81 milliGRAM(s) Oral daily  heparin   Injectable 5000 Unit(s) SubCutaneous every 12 hours  pantoprazole    Tablet 40 milliGRAM(s) Oral before breakfast  sodium chloride 0.9%. 1000 milliLiter(s) IV Continuous <Continuous>    PRN MEDICATIONS  acetaminophen     Tablet .. 650 milliGRAM(s) Oral every 6 hours PRN  aluminum hydroxide/magnesium hydroxide/simethicone Suspension 30 milliLiter(s) Oral every 4 hours PRN  melatonin 3 milliGRAM(s) Oral at bedtime PRN  ondansetron Injectable 4 milliGRAM(s) IV Push every 4 hours PRN    HOME MEDICATIONS  Home Medications:      LABS:                        14.5   12.41 )-----------( 293      ( 16 Oct 2024 06:22 )             42.3     10-16    139  |  104  |  14  ----------------------------<  105[H]  4.5   |  23  |  1.2    Ca    9.1      16 Oct 2024 06:22    TPro  6.4  /  Alb  4.2  /  TBili  0.6  /  DBili  x   /  AST  57[H]  /  ALT  62[H]  /  AlkPhos  50  10-16    LIVER FUNCTIONS - ( 16 Oct 2024 06:22 )  Alb: 4.2 g/dL / Pro: 6.4 g/dL / ALK PHOS: 50 U/L / ALT: 62 U/L / AST: 57 U/L / GGT: x           PT/INR - ( 15 Oct 2024 17:05 )   PT: 12.10 sec;   INR: 1.06 ratio         PTT - ( 15 Oct 2024 17:05 )  PTT:31.2 sec  Urinalysis Basic - ( 16 Oct 2024 06:22 )    Color: x / Appearance: x / SG: x / pH: x  Gluc: 105 mg/dL / Ketone: x  / Bili: x / Urobili: x   Blood: x / Protein: x / Nitrite: x   Leuk Esterase: x / RBC: x / WBC x   Sq Epi: x / Non Sq Epi: x / Bacteria: x        Lactate, Blood: 1.6 mmol/L (10-15-24 @ 19:10)      Urinalysis with Rflx Culture (collected 15 Oct 2024 16:15)          CAPILLARY BLOOD GLUCOSE          PHYSICAL EXAM:  T(C): 37.1 (10-16-24 @ 14:27), Max: 37.1 (10-16-24 @ 14:27)  HR: 75 (10-16-24 @ 14:27) (59 - 87)  BP: 134/77 (10-16-24 @ 14:27) (134/77 - 159/85)  RR: 18 (10-16-24 @ 14:27) (18 - 20)  SpO2: 97% (10-16-24 @ 14:27) (97% - 100%)    GENERAL: NAD, well-developed, 64y  RESPIRATORY: Clear to auscultation bilaterally; No wheeze or crackles  CARDIOVASCULAR: Regular rate and rhythm;  GASTROINTESTINAL: Abdomen Soft, Nontender, Nondistended, +BS  PSYCH: AAOx3, affect appropriate  NEUROLOGY: non-focal, motor and sensory function intact    ADMISSION SUMMARY  Patient is a 64y old Male who presents with a chief complaint of chest pain /syncope (16 Oct 2024 08:41)

## 2024-10-16 NOTE — PROGRESS NOTE ADULT - ASSESSMENT
64-year-old male presents to the ED for evaluation of nausea and vomiting that began the day of presentation. Pt had about 5-6 episodes of NBNB vomiting associated with sweating and chest discomfort as well as brief syncope. Admitted for nausea, vomiting, and syncope.    #Nausea/Vomiting, possibly secondary to gastroenteritis/food poisoning  #Syncope, likely vasovagal  - CTH negative for acute findings  - CT A/P: no acute findings. non obstructing R renal stone  - EKG no ischemic changes, trops neg x2  - cardiology recs noted  - orthostatics BP negative  - TTE EF 58%  - Neurology eval noted  - MRI Brain with and without gadolinium  - MRA Head/Neck without contrast     Misc:  DVT ppx: Heparin subQ  GI ppx: Protonix  Diet: Regular  Activity: IAT  Code: Full Code  Dispo: Acute

## 2024-10-16 NOTE — CONSULT NOTE ADULT - SUBJECTIVE AND OBJECTIVE BOX
NEUROLOGY CONSULT    HPI: 64y M PMH anxiety presented to ED for evaluation of nausea and vomiting. Patient notes that the day prior he felt as if he had a "can over his head" and felt fullness of his ears. He also did not have much of an appetite. The next day he still felt that sensation although was able to go to the gym in the morning without issue. That afternoon he started vomiting, while bent over the sink. He also started experiencing shortness of breath and felt as if something was sitting on his chest. Wife gave him a klonopin but he vomited it up. Upon standing up straight he noticed a spinning sensation and fell to the floor. He thinks he lost consciousness for around 30 seconds. On the ambulance ride to the hospital, he noticed dizziness somewhat improved with his eyes closed. Dizziness persisted until he was medication (zofran) in the ED. He has never had an episode similar to this in the past. No recent travel/illness that he is aware of.     Neuro consulted. Patient seen at bedside. Also notes that he got hearing aides around 6months ago. This morning dizziness not bothering him, although he has a headache.      MEDICATIONS  Home Medications:    MEDICATIONS  (STANDING):  aspirin enteric coated 81 milliGRAM(s) Oral daily  heparin   Injectable 5000 Unit(s) SubCutaneous every 12 hours  pantoprazole    Tablet 40 milliGRAM(s) Oral before breakfast  sodium chloride 0.9%. 1000 milliLiter(s) (75 mL/Hr) IV Continuous <Continuous>    MEDICATIONS  (PRN):  acetaminophen     Tablet .. 650 milliGRAM(s) Oral every 6 hours PRN Temp greater or equal to 38C (100.4F), Mild Pain (1 - 3)  aluminum hydroxide/magnesium hydroxide/simethicone Suspension 30 milliLiter(s) Oral every 4 hours PRN Dyspepsia  melatonin 3 milliGRAM(s) Oral at bedtime PRN Insomnia  ondansetron Injectable 4 milliGRAM(s) IV Push every 4 hours PRN Nausea and/or Vomiting      FAMILY HISTORY:  FH: cancer      SOCIAL HISTORY: negative for tobacco, alcohol, or illicit drug use.    Allergies    No Known Allergies    Intolerances        GEN: NAD, pleasant, cooperative    NEURO:   MENTAL STATUS: AAOx3  LANG/SPEECH: Fluent, intact naming, repetition & comprehension  CRANIAL NERVES:  II: Pupils equal and reactive, no RAPD, normal visual fields  III, IV, VI: EOM intact, no gaze preference or deviation minimal rotary nystagmus   V: normal  VII: no facial asymmetry  VIII: mildly reduced hearing to speech (pt not wearing his hearing aides)   MOTOR: 5/5 in both upper and lower extremities  REFLEXES: downgoing toes   SENSORY: Normal to light touch in all extremities   COORD: Normal finger to nose and heel to shin, no tremor, no dysmetria  No Pronator drift       LABS:                        14.5   12.41 )-----------( 293      ( 16 Oct 2024 06:22 )             42.3     10-16    139  |  104  |  14  ----------------------------<  105[H]  4.5   |  23  |  1.2    Ca    9.1      16 Oct 2024 06:22    TPro  6.4  /  Alb  4.2  /  TBili  0.6  /  DBili  x   /  AST  57[H]  /  ALT  62[H]  /  AlkPhos  50  10-16    Hemoglobin A1C:   Vitamin B12   PT/INR - ( 15 Oct 2024 17:05 )   PT: 12.10 sec;   INR: 1.06 ratio         PTT - ( 15 Oct 2024 17:05 )  PTT:31.2 sec  CAPILLARY BLOOD GLUCOSE          Urinalysis Basic - ( 16 Oct 2024 06:22 )    Color: x / Appearance: x / SG: x / pH: x  Gluc: 105 mg/dL / Ketone: x  / Bili: x / Urobili: x   Blood: x / Protein: x / Nitrite: x   Leuk Esterase: x / RBC: x / WBC x   Sq Epi: x / Non Sq Epi: x / Bacteria: x        Microbiology:    Urinalysis with Rflx Culture (collected 15 Oct 2024 16:15)        RADIOLOGY    < from: CT Head No Cont (10.15.24 @ 18:25) >  IMPRESSION:    No acute intracranial pathology.    --- End of Report ---    < end of copied text >           NEUROLOGY CONSULT    HPI: 64y M PMH anxiety presented to ED for evaluation of nausea and vomiting. Patient notes that the day prior he felt as if he had a "can over his head" and felt fullness of his ears. He also did not have much of an appetite. The next day he still felt that sensation although was able to go to the gym in the morning without issue. That afternoon he started vomiting, while bent over the sink. He also started experiencing shortness of breath and felt as if something was sitting on his chest. Wife gave him a klonopin but he vomited it up. Upon standing up straight he noticed a spinning sensation and fell to the floor. He thinks he lost consciousness for around 30 seconds. On the ambulance ride to the hospital, he noticed dizziness would somewhat improve when he closed his eyes. Dizziness persisted until he was given medication (zofran) in the ED. He has never had an episode similar to this in the past. No recent travel/illness that he is aware of.     Neuro consulted. Patient seen at bedside. Also notes that he got hearing aides around 6months ago. This morning dizziness resolved, although he has a headache.      MEDICATIONS  Home Medications:    MEDICATIONS  (STANDING):  aspirin enteric coated 81 milliGRAM(s) Oral daily  heparin   Injectable 5000 Unit(s) SubCutaneous every 12 hours  pantoprazole    Tablet 40 milliGRAM(s) Oral before breakfast  sodium chloride 0.9%. 1000 milliLiter(s) (75 mL/Hr) IV Continuous <Continuous>    MEDICATIONS  (PRN):  acetaminophen     Tablet .. 650 milliGRAM(s) Oral every 6 hours PRN Temp greater or equal to 38C (100.4F), Mild Pain (1 - 3)  aluminum hydroxide/magnesium hydroxide/simethicone Suspension 30 milliLiter(s) Oral every 4 hours PRN Dyspepsia  melatonin 3 milliGRAM(s) Oral at bedtime PRN Insomnia  ondansetron Injectable 4 milliGRAM(s) IV Push every 4 hours PRN Nausea and/or Vomiting      FAMILY HISTORY:  FH: cancer      SOCIAL HISTORY: negative for tobacco, alcohol, or illicit drug use.    Allergies    No Known Allergies    Intolerances        GEN: NAD, pleasant, cooperative    NEURO:   MENTAL STATUS: AAOx3  LANG/SPEECH: Fluent, intact naming, repetition & comprehension  CRANIAL NERVES:  II: Pupils equal and reactive, no RAPD, normal visual fields  III, IV, VI: EOM intact, no gaze preference or deviation minimal rotary nystagmus   V: normal  VII: no facial asymmetry  VIII: mildly reduced hearing to speech (pt not wearing his hearing aides)   MOTOR: 5/5 in both upper and lower extremities  REFLEXES: downgoing toes   SENSORY: Normal to light touch in all extremities   COORD: Normal finger to nose and heel to shin, no tremor, no dysmetria  No Pronator drift       LABS:                        14.5   12.41 )-----------( 293      ( 16 Oct 2024 06:22 )             42.3     10-16    139  |  104  |  14  ----------------------------<  105[H]  4.5   |  23  |  1.2    Ca    9.1      16 Oct 2024 06:22    TPro  6.4  /  Alb  4.2  /  TBili  0.6  /  DBili  x   /  AST  57[H]  /  ALT  62[H]  /  AlkPhos  50  10-16    Hemoglobin A1C:   Vitamin B12   PT/INR - ( 15 Oct 2024 17:05 )   PT: 12.10 sec;   INR: 1.06 ratio         PTT - ( 15 Oct 2024 17:05 )  PTT:31.2 sec  CAPILLARY BLOOD GLUCOSE          Urinalysis Basic - ( 16 Oct 2024 06:22 )    Color: x / Appearance: x / SG: x / pH: x  Gluc: 105 mg/dL / Ketone: x  / Bili: x / Urobili: x   Blood: x / Protein: x / Nitrite: x   Leuk Esterase: x / RBC: x / WBC x   Sq Epi: x / Non Sq Epi: x / Bacteria: x        Microbiology:    Urinalysis with Rflx Culture (collected 15 Oct 2024 16:15)        RADIOLOGY    Impression:  Normal REEG    Clinical Correlation:  Normal study does not exclude diagnosis of seizure disorder    Earline Preciado MD  Attending Neurologist, Division of Epilepsy    < from: CT Head No Cont (10.15.24 @ 18:25) >  IMPRESSION:    No acute intracranial pathology.    --- End of Report ---    < end of copied text >           NEUROLOGY CONSULT    HPI: 64y M PMH anxiety presented to ED for evaluation of nausea and vomiting. Patient notes that the day prior he felt as if he had a "can over his head" and felt fullness of his ears. He also did not have much of an appetite. The next day he still felt that sensation although was able to go to the gym in the morning without issue. That afternoon he started vomiting, while bent over the sink. He also started experiencing shortness of breath and felt as if something was sitting on his chest. Wife gave him a klonopin but he vomited it up. Upon standing up straight he noticed a spinning sensation and fell to the floor. He thinks he lost consciousness for around 30 seconds. On the ambulance ride to the hospital, he noticed dizziness would somewhat improve when he closed his eyes. Dizziness persisted until he was given medication (zofran) in the ED. He has never had an episode similar to this in the past. No recent travel/illness that he is aware of.     Neuro consulted. Patient seen at bedside. Also notes that he got hearing aides around 6months ago. This morning dizziness resolved, although he has a headache.      MEDICATIONS  Home Medications:    MEDICATIONS  (STANDING):  aspirin enteric coated 81 milliGRAM(s) Oral daily  heparin   Injectable 5000 Unit(s) SubCutaneous every 12 hours  pantoprazole    Tablet 40 milliGRAM(s) Oral before breakfast  sodium chloride 0.9%. 1000 milliLiter(s) (75 mL/Hr) IV Continuous <Continuous>    MEDICATIONS  (PRN):  acetaminophen     Tablet .. 650 milliGRAM(s) Oral every 6 hours PRN Temp greater or equal to 38C (100.4F), Mild Pain (1 - 3)  aluminum hydroxide/magnesium hydroxide/simethicone Suspension 30 milliLiter(s) Oral every 4 hours PRN Dyspepsia  melatonin 3 milliGRAM(s) Oral at bedtime PRN Insomnia  ondansetron Injectable 4 milliGRAM(s) IV Push every 4 hours PRN Nausea and/or Vomiting      FAMILY HISTORY:  FH: cancer      SOCIAL HISTORY: negative for tobacco, alcohol, or illicit drug use.    Allergies    No Known Allergies    Intolerances        GEN: NAD, pleasant, cooperative    NEURO:   MENTAL STATUS: AAOx3  LANG/SPEECH: Fluent, intact naming, repetition & comprehension  CRANIAL NERVES:  II: Pupils equal and reactive, no RAPD, normal visual fields  III, IV, VI: EOM intact, no gaze preference or deviation minimal rotary nystagmus   V: normal  VII: no facial asymmetry  VIII: mildly reduced hearing to speech (pt not wearing his hearing aides)   MOTOR: 5/5 in both upper and lower extremities  REFLEXES: downgoing toes   SENSORY: Normal to light touch in all extremities   COORD: Mild dysmetria (R) on Finger to nose, no overshoot  No Pronator drift       LABS:                        14.5   12.41 )-----------( 293      ( 16 Oct 2024 06:22 )             42.3     10-16    139  |  104  |  14  ----------------------------<  105[H]  4.5   |  23  |  1.2    Ca    9.1      16 Oct 2024 06:22    TPro  6.4  /  Alb  4.2  /  TBili  0.6  /  DBili  x   /  AST  57[H]  /  ALT  62[H]  /  AlkPhos  50  10-16    Hemoglobin A1C:   Vitamin B12   PT/INR - ( 15 Oct 2024 17:05 )   PT: 12.10 sec;   INR: 1.06 ratio         PTT - ( 15 Oct 2024 17:05 )  PTT:31.2 sec  CAPILLARY BLOOD GLUCOSE          Urinalysis Basic - ( 16 Oct 2024 06:22 )    Color: x / Appearance: x / SG: x / pH: x  Gluc: 105 mg/dL / Ketone: x  / Bili: x / Urobili: x   Blood: x / Protein: x / Nitrite: x   Leuk Esterase: x / RBC: x / WBC x   Sq Epi: x / Non Sq Epi: x / Bacteria: x        Microbiology:    Urinalysis with Rflx Culture (collected 15 Oct 2024 16:15)        RADIOLOGY    Impression:  Normal REEG    Clinical Correlation:  Normal study does not exclude diagnosis of seizure disorder    Earline Preciado MD  Attending Neurologist, Division of Epilepsy    < from: CT Head No Cont (10.15.24 @ 18:25) >  IMPRESSION:    No acute intracranial pathology.    --- End of Report ---    < end of copied text >

## 2024-10-16 NOTE — CONSULT NOTE ADULT - ASSESSMENT
64-year-old male presents to the ED for evaluation of nausea vomiting.  As per family patient had an episode of syncope while at home.  Family called 911 after this incident.  Patient states he was not feeling well became nauseous sweaty and began vomiting. He had vague chest pain after vomiting . MI r/o. Claims neg stress past. No symptoms now. Near syncope possibly vaso vagal. Check ortho BP. F/U outpatient cardio. LFT were increase lactate high. ? GI.

## 2024-10-16 NOTE — CONSULT NOTE ADULT - ASSESSMENT
64y M PMH anxiety presented to ED for episode of syncope, after N/V. It seems that pt most likely had a preceding viral infection and associated vertigo. Symptoms now resolved with exception of HA. Neuro exam without focal deficits. CTH reports no acute changes. Orthostatics and EEG pending.       64y M PMH anxiety presented to ED for episode of syncope, after N/V. It seems that pt most likely had a preceding viral infection and associated vertigo. Symptoms now resolved with exception of HA. Neuro exam without focal deficits. CTH reports no acute changes. rEEG normal. Orthostatics negative.      64y M PMH anxiety presented to ED for episode of syncope, after N/V. It seems that pt most likely had a preceding viral infection and associated vertigo. Symptoms now resolved with exception of HA.  CTH reports no acute changes. rEEG normal. Orthostatics negative. However, neuro exam with mild R UE dysmetria, r/o neurovascular cause, cerebellar abnormality    Recommendations  - MRI Brain with and without gadolinium  - MRA Head/Neck without contrast   - Medical management per primary team    Discussed with attending Dr Jones  64y M PMH anxiety presented to ED for episode of syncope, after N/V. It seems that pt most likely had a preceding viral infection and associated vertigo. Symptoms now resolved with exception of HA.  CTH reports no acute changes. rEEG normal. Orthostatics negative. However, neuro exam with mild R UE dysmetria, r/o neurovascular cause, cerebellar abnormality    Recommendations  - MRI Brain with and without gadolinium  - MRA Head/Neck without contrast   - Medical management per primary team  - w/u for constitutional symptoms as per primary team  - if MRI/MRA (-), no further inpt neurologic w/u    Discussed with attending Dr Jones

## 2024-10-17 ENCOUNTER — TRANSCRIPTION ENCOUNTER (OUTPATIENT)
Age: 64
End: 2024-10-17

## 2024-10-17 VITALS
DIASTOLIC BLOOD PRESSURE: 81 MMHG | OXYGEN SATURATION: 99 % | RESPIRATION RATE: 18 BRPM | SYSTOLIC BLOOD PRESSURE: 157 MMHG | HEART RATE: 64 BPM | TEMPERATURE: 99 F

## 2024-10-17 LAB
ALBUMIN SERPL ELPH-MCNC: 4.1 G/DL — SIGNIFICANT CHANGE UP (ref 3.5–5.2)
ALP SERPL-CCNC: 46 U/L — SIGNIFICANT CHANGE UP (ref 30–115)
ALT FLD-CCNC: 46 U/L — HIGH (ref 0–41)
ANION GAP SERPL CALC-SCNC: 11 MMOL/L — SIGNIFICANT CHANGE UP (ref 7–14)
AST SERPL-CCNC: 37 U/L — SIGNIFICANT CHANGE UP (ref 0–41)
BASOPHILS # BLD AUTO: 0.06 K/UL — SIGNIFICANT CHANGE UP (ref 0–0.2)
BASOPHILS NFR BLD AUTO: 0.9 % — SIGNIFICANT CHANGE UP (ref 0–1)
BILIRUB SERPL-MCNC: 0.4 MG/DL — SIGNIFICANT CHANGE UP (ref 0.2–1.2)
BUN SERPL-MCNC: 12 MG/DL — SIGNIFICANT CHANGE UP (ref 10–20)
CALCIUM SERPL-MCNC: 9 MG/DL — SIGNIFICANT CHANGE UP (ref 8.4–10.5)
CHLORIDE SERPL-SCNC: 103 MMOL/L — SIGNIFICANT CHANGE UP (ref 98–110)
CO2 SERPL-SCNC: 22 MMOL/L — SIGNIFICANT CHANGE UP (ref 17–32)
CREAT SERPL-MCNC: 1.1 MG/DL — SIGNIFICANT CHANGE UP (ref 0.7–1.5)
EGFR: 75 ML/MIN/1.73M2 — SIGNIFICANT CHANGE UP
EOSINOPHIL # BLD AUTO: 0.14 K/UL — SIGNIFICANT CHANGE UP (ref 0–0.7)
EOSINOPHIL NFR BLD AUTO: 2.1 % — SIGNIFICANT CHANGE UP (ref 0–8)
GLUCOSE SERPL-MCNC: 110 MG/DL — HIGH (ref 70–99)
HCT VFR BLD CALC: 41.3 % — LOW (ref 42–52)
HGB BLD-MCNC: 13.7 G/DL — LOW (ref 14–18)
IMM GRANULOCYTES NFR BLD AUTO: 0.3 % — SIGNIFICANT CHANGE UP (ref 0.1–0.3)
LYMPHOCYTES # BLD AUTO: 1.43 K/UL — SIGNIFICANT CHANGE UP (ref 1.2–3.4)
LYMPHOCYTES # BLD AUTO: 21.4 % — SIGNIFICANT CHANGE UP (ref 20.5–51.1)
MAGNESIUM SERPL-MCNC: 2.1 MG/DL — SIGNIFICANT CHANGE UP (ref 1.8–2.4)
MCHC RBC-ENTMCNC: 27.8 PG — SIGNIFICANT CHANGE UP (ref 27–31)
MCHC RBC-ENTMCNC: 33.2 G/DL — SIGNIFICANT CHANGE UP (ref 32–37)
MCV RBC AUTO: 83.9 FL — SIGNIFICANT CHANGE UP (ref 80–94)
MONOCYTES # BLD AUTO: 0.58 K/UL — SIGNIFICANT CHANGE UP (ref 0.1–0.6)
MONOCYTES NFR BLD AUTO: 8.7 % — SIGNIFICANT CHANGE UP (ref 1.7–9.3)
NEUTROPHILS # BLD AUTO: 4.46 K/UL — SIGNIFICANT CHANGE UP (ref 1.4–6.5)
NEUTROPHILS NFR BLD AUTO: 66.6 % — SIGNIFICANT CHANGE UP (ref 42.2–75.2)
NRBC # BLD: 0 /100 WBCS — SIGNIFICANT CHANGE UP (ref 0–0)
PLATELET # BLD AUTO: 228 K/UL — SIGNIFICANT CHANGE UP (ref 130–400)
PMV BLD: 10.9 FL — HIGH (ref 7.4–10.4)
POTASSIUM SERPL-MCNC: 4.2 MMOL/L — SIGNIFICANT CHANGE UP (ref 3.5–5)
POTASSIUM SERPL-SCNC: 4.2 MMOL/L — SIGNIFICANT CHANGE UP (ref 3.5–5)
PROT SERPL-MCNC: 6.2 G/DL — SIGNIFICANT CHANGE UP (ref 6–8)
RBC # BLD: 4.92 M/UL — SIGNIFICANT CHANGE UP (ref 4.7–6.1)
RBC # FLD: 12.9 % — SIGNIFICANT CHANGE UP (ref 11.5–14.5)
SODIUM SERPL-SCNC: 136 MMOL/L — SIGNIFICANT CHANGE UP (ref 135–146)
WBC # BLD: 6.69 K/UL — SIGNIFICANT CHANGE UP (ref 4.8–10.8)
WBC # FLD AUTO: 6.69 K/UL — SIGNIFICANT CHANGE UP (ref 4.8–10.8)

## 2024-10-17 PROCEDURE — 99239 HOSP IP/OBS DSCHRG MGMT >30: CPT

## 2024-10-17 RX ADMIN — PANTOPRAZOLE SODIUM 40 MILLIGRAM(S): 40 TABLET, DELAYED RELEASE ORAL at 05:10

## 2024-10-17 RX ADMIN — Medication 650 MILLIGRAM(S): at 05:14

## 2024-10-17 RX ADMIN — Medication 5000 UNIT(S): at 05:10

## 2024-10-17 NOTE — DISCHARGE NOTE PROVIDER - ATTENDING DISCHARGE PHYSICAL EXAMINATION:
Vital Signs Last 24 Hrs  T(C): 37.1 (17 Oct 2024 12:13), Max: 37.1 (16 Oct 2024 14:27)  T(F): 98.7 (17 Oct 2024 12:13), Max: 98.8 (16 Oct 2024 14:27)  HR: 64 (17 Oct 2024 12:13) (64 - 75)  BP: 157/81 (17 Oct 2024 12:13) (134/77 - 167/90)  BP(mean): --  RR: 18 (17 Oct 2024 12:13) (18 - 18)  SpO2: 99% (17 Oct 2024 12:13) (97% - 99%)    Parameters below as of 17 Oct 2024 04:33  Patient On (Oxygen Delivery Method): room air    GENERAL: NAD, well-developed, 64y  RESPIRATORY: Clear to auscultation bilaterally; No wheeze or crackles  CARDIOVASCULAR: Regular rate and rhythm;  GASTROINTESTINAL: Abdomen Soft, Nontender, Nondistended, +BS  PSYCH: AAOx3, affect appropriate  NEUROLOGY: non-focal, motor and sensory function intact

## 2024-10-17 NOTE — DISCHARGE NOTE PROVIDER - CARE PROVIDER_API CALL
Underwent left shoulder I&D with irrigation yesterday    Seen this morning during HD  C/o left shoulder pain and dry mouth  States eating \"too good\"    Hemodynamically stable  Pre-HD bed wt = 68.2 kg  Initial UF goal was 2 liters but lowered to 1.5 liters with \"C\" profile on critline      Nephrology consult hx 5/25/24  Óscar is a 89 year old male  Hx of CKD stage 3b, HTN, HL, spinal stenosis, BPH, CAD s/p CAROLYNE, AF, OA, severe AS s/p TAVR (1/2024) c/b left femoral pseudoaneurysm s/p thrombin   injection followed by surgical direct repair of left CFA and EIA (3/2024)   Admitted to [facility] on 5/20 for anemia found on outpatient labs. Noted fall preceding   Found to have LLL PNA and enterobacter bacteremia   Received 1 U RBC   CT scan w/o contrast showing L retroperitoneal hematoma w/ L hydroureter   Taken by urology yesterday and had L ureteral stent placed   Other than some mild dysuria following procedure states that feels well overall     Visit Vitals  /62 (BP Location: RUE - Right upper extremity)   Pulse 69   Temp 97.7 °F (36.5 °C) (Temporal)   Resp 16   Ht 5' 7\" (1.702 m)   Wt 68.2 kg (150 lb 5.7 oz)   SpO2 98%   BMI 23.55 kg/m²     Physical Exam  Constitutional:       Appearance: Normal appearance.   HENT:      Mouth/Throat:      Mouth: Mucous membranes are moist.   Cardiovascular:      Rate and Rhythm: Normal rate and regular rhythm.      Comments: RIJ TDC  Pulmonary:      Effort: Pulmonary effort is normal.      Breath sounds: Normal breath sounds.   Abdominal:      General: Abdomen is flat.      Palpations: Abdomen is soft.      Comments:  Wound vac left hip   Musculoskeletal:         General: Swelling (Left arm) present.      Right lower leg: No edema.      Left lower leg: No edema.      Comments: Left shoulder sling   Skin:     General: Skin is warm and dry.      Findings: Bruising present.   Neurological:      General: No focal deficit present.      Mental Status: He is alert.       Recent Labs    Lab 07/02/24  0457 07/01/24  0438 06/29/24  0924 06/27/24  1132 06/25/24  1350   SODIUM 134* 135 134* 134* 136  136   POTASSIUM 4.0 4.0 3.7 4.2 4.1  4.1   CHLORIDE 101 100 99 100 100  100   CO2 27 29 29 30 28  28   BUN 57* 48* 51* 63* 74*  73*   CREATININE 2.56* 2.49* 2.50* 2.72* 3.14*  3.12*   CALCIUM 9.4 9.5 9.3 9.4 9.1  9.2   MG  --  1.9  --  2.0 2.2   PHOS 4.9* 3.9 3.8 4.0 4.9*  4.9*   ALBUMIN  --   --   --  2.4*  --    BILIRUBIN  --   --   --  0.5  --    ALKPT  --   --   --  129*  --    GPT  --   --   --  43  --    AST  --   --   --  38*  --    GLUCOSE 111* 106* 137* 94 133*  134*     Recent Labs   Lab 07/02/24 0457 07/01/24  0438 06/29/24  0924   WBC 9.3 8.9 8.7   HGB 8.1* 9.5* 8.9*   HCT 26.5* 31.1* 28.2*    234 220       Impression  -RP hematoma & left hydro post ureteral stent 5/24 with no further hydro  -Enterobacter bacteremia finished treatment 6/3  -HFpEF with Cardiomems -> PAD above goal 6/28  -Oligoanuric ANGEL no or minimal response to diuretics requiring start of HD 6/6  -Afib rate controlled now on Eliquis a/c  -Iron deficiency post venofer x 3 completed 6/2  -Acute anemia a/w hematoma post prbcs 5/30, 6/1, 6/18  -Chronic anemia a/w dec EPO + EPO resistance   -HTN has been controlled  -Septic arthritis in L shoulder s/p I&D with washout (7/1)    Plan  -HD TTS: 4 hrs, RIJ TDC, 1.5 liters UF  -Midodrine 5 mg prn sbp < 100   -Epogen 20K units IV qTTS with HD   -Transfuse for Hgb < 8  -Finasteride & tamsulosin daily  -Intermittent bladder scans +/- ISC  -Cardiac diet with 1.5 L/day PO FR  -Dose meds for eGFR < 15 ml/min  -Labs TTS w/ HD   -Monitoring for any signs of renal recovery   -Ideally avoid any IV iodinated contrast as will limit above (timing of HD will not lower risk)   -SW consulted for post hospital HD unit placement     DO Lakia Aguilar in Nephrology, [place]  Contact via [employer]   Ed Dickens  Internal Medicine  [address] [place] [address]  Phone: [phone]  Fax: [phone]  Follow Up Time: 1 week    Maximilian Jones  Neurology  [address] [place] [address], [address]  [place] [address]  Phone: [phone]  Fax: [phone]  Follow Up Time: 2 weeks    Uriel Owens  Cardiovascular Disease  [address] [place] [address]  Phone: [phone]  Fax: [phone]  Follow Up Time: 2 weeks

## 2024-10-17 NOTE — DISCHARGE NOTE NURSING/CASE MANAGEMENT/SOCIAL WORK - FINANCIAL ASSISTANCE
[facility] provides services at a reduced cost to those who are determined to be eligible through [facility]’s financial assistance program. Information regarding [facility]’s financial assistance program can be found by going to [email or web address] or by calling [phone].

## 2024-10-17 NOTE — DISCHARGE NOTE PROVIDER - NSDCCPCAREPLAN_GEN_ALL_CORE_FT
PRINCIPAL DISCHARGE DIAGNOSIS  Diagnosis: Syncope  Assessment and Plan of Treatment: most likley vasovagal  stay well hydrated  fup cardio  fup neuro  fup PMD     PRINCIPAL DISCHARGE DIAGNOSIS  Diagnosis: Syncope  Assessment and Plan of Treatment: most likley vasovagal  stay well hydrated  fup cardio  fup neuro  fup PMD  Home Blood Pressure monitoring twice daily

## 2024-10-17 NOTE — DISCHARGE NOTE NURSING/CASE MANAGEMENT/SOCIAL WORK - NSDCPEFALRISK_GEN_ALL_CORE
For information on Fall & Injury Prevention, visit: [email or web address] OR  [email or web address] OR  https://www.cdc.gov/steadi/patient.html

## 2024-10-17 NOTE — DISCHARGE NOTE PROVIDER - HOSPITAL COURSE
64-year-old male presents to the ED for evaluation of nausea and vomiting that began the day of presentation. Pt had about 5-6 episodes of NBNB vomiting associated with sweating and chest discomfort as well as brief syncope. Admitted for nausea, vomiting, and syncope.    #Nausea/Vomiting, possibly secondary to gastroenteritis/food poisoning  #Syncope, likely vasovagal  - CTH negative for acute findings  - CT A/P: no acute findings. non obstructing R renal stone  - EKG no ischemic changes, trops neg x2  - cardiology recs noted  - orthostatics BP negative  - TTE EF 58%  - Neurology eval noted  - MRI Brain with and without gadolinium - no acute findings  - MRA Head/Neck without contrast  - no acute findings    FUP OP Neuro, Cardio and PMD

## 2024-10-17 NOTE — DISCHARGE NOTE PROVIDER - PROVIDER TOKENS
PROVIDER:[TOKEN:[[id number]],FOLLOWUP:[1 week]],PROVIDER:[TOKEN:[[id number]],FOLLOWUP:[2 weeks]],PROVIDER:[TOKEN:[[id number]],FOLLOWUP:[2 weeks]]

## 2024-10-17 NOTE — DISCHARGE NOTE PROVIDER - NSDCFUSCHEDAPPT_GEN_ALL_CORE_FT
Dominic Zamorano  [facility]  [address]  Scheduled Appointment: 11/14/2024

## 2024-10-17 NOTE — DISCHARGE NOTE NURSING/CASE MANAGEMENT/SOCIAL WORK - PATIENT PORTAL LINK FT
You can access the FollowMyHealth Patient Portal offered by [facility] by registering at the following website: [email or web address]. By joining [employer]’s FollowMyHealth portal, you will also be able to view your health information using other applications (apps) compatible with our system.

## 2024-10-17 NOTE — DISCHARGE NOTE PROVIDER - CARE PROVIDERS DIRECT ADDRESSES
,DirectAddress_Unknown,[email or web address]

## 2024-10-20 LAB
CULTURE RESULTS: SIGNIFICANT CHANGE UP
CULTURE RESULTS: SIGNIFICANT CHANGE UP
SPECIMEN SOURCE: SIGNIFICANT CHANGE UP
SPECIMEN SOURCE: SIGNIFICANT CHANGE UP

## 2024-10-26 DIAGNOSIS — A05.9 BACTERIAL FOODBORNE INTOXICATION, UNSPECIFIED: ICD-10-CM

## 2024-10-26 DIAGNOSIS — Z11.52 ENCOUNTER FOR SCREENING FOR COVID-19: ICD-10-CM

## 2024-10-26 DIAGNOSIS — R55 SYNCOPE AND COLLAPSE: ICD-10-CM

## 2024-10-26 DIAGNOSIS — Z91.198 PATIENT'S NONCOMPLIANCE WITH OTHER MEDICAL TREATMENT AND REGIMEN FOR OTHER REASON: ICD-10-CM

## 2024-10-26 DIAGNOSIS — R27.8 OTHER LACK OF COORDINATION: ICD-10-CM

## 2024-10-26 DIAGNOSIS — Z94.84 STEM CELLS TRANSPLANT STATUS: ICD-10-CM

## 2024-10-26 DIAGNOSIS — G47.33 OBSTRUCTIVE SLEEP APNEA (ADULT) (PEDIATRIC): ICD-10-CM

## 2024-10-26 DIAGNOSIS — F41.9 ANXIETY DISORDER, UNSPECIFIED: ICD-10-CM

## 2024-10-26 DIAGNOSIS — R07.89 OTHER CHEST PAIN: ICD-10-CM

## 2024-10-26 DIAGNOSIS — R11.2 NAUSEA WITH VOMITING, UNSPECIFIED: ICD-10-CM

## 2024-10-26 DIAGNOSIS — F32.A DEPRESSION, UNSPECIFIED: ICD-10-CM

## 2024-11-14 ENCOUNTER — APPOINTMENT (OUTPATIENT)
Dept: OTOLARYNGOLOGY | Facility: CLINIC | Age: 64
End: 2024-11-14
Payer: MEDICARE

## 2024-11-14 ENCOUNTER — APPOINTMENT (OUTPATIENT)
Dept: OTOLARYNGOLOGY | Facility: CLINIC | Age: 64
End: 2024-11-14

## 2024-11-14 VITALS — BODY MASS INDEX: 29.55 KG/M2 | HEIGHT: 68 IN | WEIGHT: 195 LBS

## 2024-11-14 DIAGNOSIS — J31.0 CHRONIC RHINITIS: ICD-10-CM

## 2024-11-14 DIAGNOSIS — R09.81 NASAL CONGESTION: ICD-10-CM

## 2024-11-14 PROCEDURE — 99214 OFFICE O/P EST MOD 30 MIN: CPT | Mod: 25

## 2024-11-14 PROCEDURE — 31231 NASAL ENDOSCOPY DX: CPT

## 2024-11-14 RX ORDER — DOXYCYCLINE HYCLATE 100 MG/1
100 CAPSULE ORAL DAILY
Qty: 14 | Refills: 0 | Status: ACTIVE | COMMUNITY
Start: 2024-11-14 | End: 1900-01-01

## 2024-11-25 ENCOUNTER — APPOINTMENT (OUTPATIENT)
Dept: OTOLARYNGOLOGY | Facility: CLINIC | Age: 64
End: 2024-11-25
Payer: MEDICARE

## 2024-11-25 DIAGNOSIS — R09.81 NASAL CONGESTION: ICD-10-CM

## 2024-11-25 DIAGNOSIS — J31.0 CHRONIC RHINITIS: ICD-10-CM

## 2024-11-25 PROCEDURE — 30117 REMOVAL OF INTRANASAL LESION: CPT

## 2024-11-25 PROCEDURE — 31243Z: CUSTOM

## 2024-12-09 ENCOUNTER — APPOINTMENT (OUTPATIENT)
Dept: OTOLARYNGOLOGY | Facility: CLINIC | Age: 64
End: 2024-12-09
Payer: MEDICARE

## 2024-12-09 DIAGNOSIS — Z98.890 OTHER SPECIFIED POSTPROCEDURAL STATES: ICD-10-CM

## 2024-12-09 DIAGNOSIS — J30.89 OTHER ALLERGIC RHINITIS: ICD-10-CM

## 2024-12-09 PROCEDURE — 99024 POSTOP FOLLOW-UP VISIT: CPT

## 2025-01-24 ENCOUNTER — EMERGENCY (EMERGENCY)
Facility: HOSPITAL | Age: 65
LOS: 0 days | Discharge: HOME | End: 2025-01-24
Attending: EMERGENCY MEDICINE
Payer: MEDICARE

## 2025-01-24 VITALS
RESPIRATION RATE: 18 BRPM | SYSTOLIC BLOOD PRESSURE: 149 MMHG | TEMPERATURE: 98 F | HEIGHT: 68 IN | WEIGHT: 190.04 LBS | DIASTOLIC BLOOD PRESSURE: 84 MMHG | HEART RATE: 80 BPM | OXYGEN SATURATION: 100 %

## 2025-01-24 DIAGNOSIS — Z94.84 STEM CELLS TRANSPLANT STATUS: Chronic | ICD-10-CM

## 2025-01-24 DIAGNOSIS — R19.09 OTHER INTRA-ABDOMINAL AND PELVIC SWELLING, MASS AND LUMP: Chronic | ICD-10-CM

## 2025-01-24 DIAGNOSIS — Z90.89 ACQUIRED ABSENCE OF OTHER ORGANS: Chronic | ICD-10-CM

## 2025-01-24 DIAGNOSIS — Z98.890 OTHER SPECIFIED POSTPROCEDURAL STATES: Chronic | ICD-10-CM

## 2025-01-24 DIAGNOSIS — Z23 ENCOUNTER FOR IMMUNIZATION: ICD-10-CM
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
## 2025-01-24 DIAGNOSIS — K42.9 UMBILICAL HERNIA WITHOUT OBSTRUCTION OR GANGRENE: Chronic | ICD-10-CM

## 2025-01-24 PROCEDURE — 12001 RPR S/N/AX/GEN/TRNK 2.5CM/<: CPT

## 2025-01-24 PROCEDURE — 99283 EMERGENCY DEPT VISIT LOW MDM: CPT | Mod: 25

## 2025-01-24 PROCEDURE — 99284 EMERGENCY DEPT VISIT MOD MDM: CPT | Mod: FS,25

## 2025-01-24 PROCEDURE — 90471 IMMUNIZATION ADMIN: CPT

## 2025-01-24 PROCEDURE — 90715 TDAP VACCINE 7 YRS/> IM: CPT

## 2025-01-24 RX ORDER — CLOSTRIDIUM TETANI TOXOID ANTIGEN (FORMALDEHYDE INACTIVATED), CORYNEBACTERIUM DIPHTHERIAE TOXOID ANTIGEN (FORMALDEHYDE INACTIVATED), BORDETELLA PERTUSSIS TOXOID ANTIGEN (GLUTARALDEHYDE INACTIVATED), BORDETELLA PERTUSSIS FILAMENTOUS HEMAGGLUTININ ANTIGEN (FORMALDEHYDE INACTIVATED), BORDETELLA PERTUSSIS PERTACTIN ANTIGEN, AND BORDETELLA PERTUSSIS FIMBRIAE 2/3 ANTIGEN 5; 2; 2.5; 5; 3; 5 [LF]/.5ML; [LF]/.5ML; UG/.5ML; UG/.5ML; UG/.5ML; UG/.5ML
0.5 INJECTION, SUSPENSION INTRAMUSCULAR ONCE
Refills: 0 | Status: COMPLETED | OUTPATIENT
Start: 2025-01-24 | End: 2025-01-24

## 2025-01-24 RX ADMIN — CLOSTRIDIUM TETANI TOXOID ANTIGEN (FORMALDEHYDE INACTIVATED), CORYNEBACTERIUM DIPHTHERIAE TOXOID ANTIGEN (FORMALDEHYDE INACTIVATED), BORDETELLA PERTUSSIS TOXOID ANTIGEN (GLUTARALDEHYDE INACTIVATED), BORDETELLA PERTUSSIS FILAMENTOUS HEMAGGLUTININ ANTIGEN (FORMALDEHYDE INACTIVATED), BORDETELLA PERTUSSIS PERTACTIN ANTIGEN, AND BORDETELLA PERTUSSIS FIMBRIAE 2/3 ANTIGEN 0.5 MILLILITER(S): 5; 2; 2.5; 5; 3; 5 INJECTION, SUSPENSION INTRAMUSCULAR at 14:47

## 2025-01-24 NOTE — ED PROVIDER NOTE - ATTENDING APP SHARED VISIT CONTRIBUTION OF CARE
I have personally performed a history and physical exam on this patient and personally directed the management of the patient.  Patient is a 64-year-old male presents for evaluation of laceration to the left hand while using a razor located medial aspect palmar aspect adjacent to the fifth metatarsal bleeding controlled at this point    On physical exam patient has 3 cm laceration medial aspect of the palm adjacent to the fifth metacarpal radial pulse 2+ capillary refill is normal no signs of weakness no signs of tendon injury no signs of infection no signs of vascular compromise patient underwent left laceration repair that was successful tetanus was updated and will discharge follow-up with PMD in the next 24 to 48 hours we discussed indications to return patient is aware

## 2025-01-24 NOTE — ED ADULT NURSE NOTE - SUICIDE SCREENING QUESTION 2
Called patient re: HGB and BNP.  I received VM.  Left detailed message advising to go to ER and to let them know he had bloodwork today and his HGB was 7.3 and BNP 3600.  I advised I would call in advance but since I had to leave message not sure what ER he will go to.      I did call Mitzy's number as well, but it just beeped twice and then disconnected.  I tried to call several times.    No

## 2025-01-24 NOTE — ED PROVIDER NOTE - OBJECTIVE STATEMENT
Patient is a 64-year-old male here for evaluation of laceration to the left hand after accidentally cutting self with a  earlier today.  Patient denies weakness, numbness

## 2025-01-24 NOTE — ED PROVIDER NOTE - PHYSICAL EXAMINATION
VITAL SIGNS: I have reviewed nursing notes and confirm.  CONSTITUTIONAL: Well-developed; well-nourished; in no acute distress.   SKIN:  2.5cm lac to left dorsal hand, no tendon involvement  HEAD: Normocephalic; atraumatic.  EYES:  conjunctiva and sclera clear.  ENT: No nasal discharge; airway clear.  EXT: full rom of affected hand Normal ROM.  No clubbing, cyanosis or edema.   NEURO: Alert, oriented, grossly unremarkable

## 2025-01-24 NOTE — ED PROVIDER NOTE - PATIENT PORTAL LINK FT
You can access the FollowMyHealth Patient Portal offered by [facility] by registering at the following website: [email or web address]. By joining [employer]’s FollowMyHealth portal, you will also be able to view your health information using other applications (apps) compatible with our system.

## 2025-01-24 NOTE — ED PROVIDER NOTE - CLINICAL SUMMARY MEDICAL DECISION MAKING FREE TEXT BOX
Patient is a 64-year-old male presents for evaluation of laceration to the left hand while using a razor located medial aspect palmar aspect adjacent to the fifth metatarsal bleeding controlled at this point    On physical exam patient has 3 cm laceration medial aspect of the palm adjacent to the fifth metacarpal radial pulse 2+ capillary refill is normal no signs of weakness no signs of tendon injury no signs of infection no signs of vascular compromise patient underwent left laceration repair that was successful tetanus was updated and will discharge follow-up with PMD in the next 24 to 48 hours we discussed indications to return patient is aware

## 2025-01-25 ENCOUNTER — APPOINTMENT (OUTPATIENT)
Dept: ORTHOPEDIC SURGERY | Facility: CLINIC | Age: 65
End: 2025-01-25
Payer: MEDICARE

## 2025-01-25 VITALS — BODY MASS INDEX: 28.79 KG/M2 | WEIGHT: 190 LBS | HEIGHT: 68 IN

## 2025-01-25 DIAGNOSIS — S46.211A STRAIN OF MUSCLE, FASCIA AND TENDON OF OTHER PARTS OF BICEPS, RIGHT ARM, INITIAL ENCOUNTER: ICD-10-CM

## 2025-01-25 PROCEDURE — 99213 OFFICE O/P EST LOW 20 MIN: CPT

## 2025-01-25 PROCEDURE — 73030 X-RAY EXAM OF SHOULDER: CPT | Mod: RT

## 2025-01-25 RX ORDER — NABUMETONE 750 MG/1
750 TABLET, FILM COATED ORAL
Qty: 60 | Refills: 1 | Status: ACTIVE | COMMUNITY
Start: 2025-01-25 | End: 1900-01-01

## 2025-02-07 ENCOUNTER — APPOINTMENT (OUTPATIENT)
Facility: CLINIC | Age: 65
End: 2025-02-07
Payer: MEDICARE

## 2025-02-07 DIAGNOSIS — S46.211A STRAIN OF MUSCLE, FASCIA AND TENDON OF OTHER PARTS OF BICEPS, RIGHT ARM, INITIAL ENCOUNTER: ICD-10-CM

## 2025-02-07 PROCEDURE — 99204 OFFICE O/P NEW MOD 45 MIN: CPT

## 2025-04-04 ENCOUNTER — APPOINTMENT (OUTPATIENT)
Dept: UROLOGY | Facility: CLINIC | Age: 65
End: 2025-04-04
Payer: MEDICARE

## 2025-04-04 VITALS
WEIGHT: 190 LBS | OXYGEN SATURATION: 97 % | DIASTOLIC BLOOD PRESSURE: 79 MMHG | HEIGHT: 68 IN | SYSTOLIC BLOOD PRESSURE: 156 MMHG | TEMPERATURE: 98 F | BODY MASS INDEX: 28.79 KG/M2 | RESPIRATION RATE: 17 BRPM | HEART RATE: 68 BPM

## 2025-04-04 DIAGNOSIS — N52.9 MALE ERECTILE DYSFUNCTION, UNSPECIFIED: ICD-10-CM

## 2025-04-04 DIAGNOSIS — Z12.5 ENCOUNTER FOR SCREENING FOR MALIGNANT NEOPLASM OF PROSTATE: ICD-10-CM

## 2025-04-04 LAB
BILIRUB UR QL STRIP: NORMAL
COLLECTION METHOD: NORMAL
GLUCOSE UR-MCNC: NORMAL
HCG UR QL: 0.2 EU/DL
HGB UR QL STRIP.AUTO: NORMAL
KETONES UR-MCNC: NORMAL
LEUKOCYTE ESTERASE UR QL STRIP: NORMAL
NITRITE UR QL STRIP: NORMAL
PH UR STRIP: 6
PROT UR STRIP-MCNC: NORMAL
SP GR UR STRIP: 1.02

## 2025-04-04 PROCEDURE — 99213 OFFICE O/P EST LOW 20 MIN: CPT

## 2025-04-30 ENCOUNTER — APPOINTMENT (OUTPATIENT)
Dept: ORTHOPEDIC SURGERY | Facility: AMBULATORY SURGERY CENTER | Age: 65
End: 2025-04-30

## 2025-04-30 DIAGNOSIS — S46.211A STRAIN OF MUSCLE, FASCIA AND TENDON OF OTHER PARTS OF BICEPS, RIGHT ARM, INITIAL ENCOUNTER: ICD-10-CM

## 2025-04-30 PROCEDURE — 23430 REPAIR BICEPS TENDON: CPT | Mod: RT

## 2025-04-30 PROCEDURE — 29827 SHO ARTHRS SRG RT8TR CUF RPR: CPT | Mod: RT

## 2025-04-30 PROCEDURE — 29824 SHO ARTHRS SRG DSTL CLAVICLC: CPT | Mod: RT

## 2025-04-30 PROCEDURE — 29826 SHO ARTHRS SRG DECOMPRESSION: CPT | Mod: RT

## 2025-04-30 PROCEDURE — 29823 SHO ARTHRS SRG XTNSV DBRDMT: CPT | Mod: RT

## 2025-04-30 RX ORDER — OXYCODONE AND ACETAMINOPHEN 5; 325 MG/1; MG/1
5-325 TABLET ORAL
Qty: 28 | Refills: 0 | Status: ACTIVE | COMMUNITY
Start: 2025-04-30 | End: 1900-01-01

## 2025-05-08 ENCOUNTER — APPOINTMENT (OUTPATIENT)
Dept: ORTHOPEDIC SURGERY | Facility: CLINIC | Age: 65
End: 2025-05-08
Payer: MEDICARE

## 2025-05-08 DIAGNOSIS — S46.211A STRAIN OF MUSCLE, FASCIA AND TENDON OF OTHER PARTS OF BICEPS, RIGHT ARM, INITIAL ENCOUNTER: ICD-10-CM

## 2025-05-08 PROCEDURE — 99024 POSTOP FOLLOW-UP VISIT: CPT

## 2025-06-19 ENCOUNTER — APPOINTMENT (OUTPATIENT)
Dept: ORTHOPEDIC SURGERY | Facility: CLINIC | Age: 65
End: 2025-06-19
Payer: MEDICARE

## 2025-06-19 PROCEDURE — 99024 POSTOP FOLLOW-UP VISIT: CPT

## 2025-08-21 ENCOUNTER — APPOINTMENT (OUTPATIENT)
Dept: ORTHOPEDIC SURGERY | Facility: CLINIC | Age: 65
End: 2025-08-21
Payer: MEDICARE

## 2025-08-21 DIAGNOSIS — M25.511 PAIN IN RIGHT SHOULDER: ICD-10-CM

## 2025-08-21 PROCEDURE — 99213 OFFICE O/P EST LOW 20 MIN: CPT

## 2025-09-14 ENCOUNTER — EMERGENCY (EMERGENCY)
Facility: HOSPITAL | Age: 65
LOS: 0 days | Discharge: HOME | End: 2025-09-15
Attending: EMERGENCY MEDICINE
Payer: MEDICARE

## 2025-09-14 VITALS
DIASTOLIC BLOOD PRESSURE: 98 MMHG | TEMPERATURE: 98 F | HEART RATE: 55 BPM | WEIGHT: 195.11 LBS | OXYGEN SATURATION: 100 % | RESPIRATION RATE: 16 BRPM | SYSTOLIC BLOOD PRESSURE: 182 MMHG

## 2025-09-14 DIAGNOSIS — Z94.84 STEM CELLS TRANSPLANT STATUS: Chronic | ICD-10-CM

## 2025-09-14 DIAGNOSIS — R11.2 NAUSEA WITH VOMITING, UNSPECIFIED: ICD-10-CM

## 2025-09-14 DIAGNOSIS — Z98.890 OTHER SPECIFIED POSTPROCEDURAL STATES: Chronic | ICD-10-CM

## 2025-09-14 DIAGNOSIS — K42.9 UMBILICAL HERNIA WITHOUT OBSTRUCTION OR GANGRENE: Chronic | ICD-10-CM

## 2025-09-14 DIAGNOSIS — N13.2 HYDRONEPHROSIS WITH RENAL AND URETERAL CALCULOUS OBSTRUCTION: ICD-10-CM

## 2025-09-14 DIAGNOSIS — Z87.891 PERSONAL HISTORY OF NICOTINE DEPENDENCE: ICD-10-CM

## 2025-09-14 DIAGNOSIS — Z90.89 ACQUIRED ABSENCE OF OTHER ORGANS: Chronic | ICD-10-CM

## 2025-09-14 DIAGNOSIS — R19.09 OTHER INTRA-ABDOMINAL AND PELVIC SWELLING, MASS AND LUMP: Chronic | ICD-10-CM

## 2025-09-14 LAB
ALBUMIN SERPL ELPH-MCNC: 4.6 G/DL — SIGNIFICANT CHANGE UP (ref 3.5–5.2)
ALP SERPL-CCNC: 61 U/L — SIGNIFICANT CHANGE UP (ref 30–115)
ALT FLD-CCNC: 32 U/L — SIGNIFICANT CHANGE UP (ref 0–41)
ANION GAP SERPL CALC-SCNC: 16 MMOL/L — HIGH (ref 7–14)
APPEARANCE UR: ABNORMAL
AST SERPL-CCNC: 29 U/L — SIGNIFICANT CHANGE UP (ref 0–41)
BACTERIA # UR AUTO: NEGATIVE /HPF — SIGNIFICANT CHANGE UP
BASOPHILS # BLD AUTO: 0.1 K/UL — SIGNIFICANT CHANGE UP (ref 0–0.2)
BASOPHILS NFR BLD AUTO: 0.8 % — SIGNIFICANT CHANGE UP (ref 0–2)
BILIRUB SERPL-MCNC: 0.2 MG/DL — SIGNIFICANT CHANGE UP (ref 0.2–1.2)
BILIRUB UR-MCNC: NEGATIVE — SIGNIFICANT CHANGE UP
BUN SERPL-MCNC: 18 MG/DL — SIGNIFICANT CHANGE UP (ref 10–20)
CALCIUM SERPL-MCNC: 10.3 MG/DL — SIGNIFICANT CHANGE UP (ref 8.4–10.5)
CHLORIDE SERPL-SCNC: 103 MMOL/L — SIGNIFICANT CHANGE UP (ref 98–110)
CO2 SERPL-SCNC: 21 MMOL/L — SIGNIFICANT CHANGE UP (ref 17–32)
COLOR SPEC: YELLOW — SIGNIFICANT CHANGE UP
CREAT SERPL-MCNC: 1.4 MG/DL — SIGNIFICANT CHANGE UP (ref 0.7–1.5)
DIFF PNL FLD: ABNORMAL
EGFR: 56 ML/MIN/1.73M2 — LOW
EGFR: 56 ML/MIN/1.73M2 — LOW
EOSINOPHIL # BLD AUTO: 0.26 K/UL — SIGNIFICANT CHANGE UP (ref 0–0.5)
EOSINOPHIL NFR BLD AUTO: 2.1 % — SIGNIFICANT CHANGE UP (ref 0–6)
EPI CELLS # UR: SIGNIFICANT CHANGE UP
GLUCOSE SERPL-MCNC: 113 MG/DL — HIGH (ref 70–99)
GLUCOSE UR QL: NEGATIVE MG/DL — SIGNIFICANT CHANGE UP
HCT VFR BLD CALC: 44.9 % — SIGNIFICANT CHANGE UP (ref 39–50)
HGB BLD-MCNC: 15.5 G/DL — SIGNIFICANT CHANGE UP (ref 13–17)
IMM GRANULOCYTES # BLD AUTO: 0.05 K/UL — SIGNIFICANT CHANGE UP (ref 0–0.07)
IMM GRANULOCYTES NFR BLD AUTO: 0.4 % — SIGNIFICANT CHANGE UP (ref 0–0.9)
KETONES UR QL: ABNORMAL MG/DL
LACTATE SERPL-SCNC: 3.3 MMOL/L — HIGH (ref 0.7–2)
LEUKOCYTE ESTERASE UR-ACNC: NEGATIVE — SIGNIFICANT CHANGE UP
LIDOCAIN IGE QN: 47 U/L — SIGNIFICANT CHANGE UP (ref 7–60)
LYMPHOCYTES # BLD AUTO: 3.01 K/UL — SIGNIFICANT CHANGE UP (ref 1–3.3)
LYMPHOCYTES NFR BLD AUTO: 24.5 % — SIGNIFICANT CHANGE UP (ref 13–44)
MCHC RBC-ENTMCNC: 28.8 PG — SIGNIFICANT CHANGE UP (ref 27–34)
MCHC RBC-ENTMCNC: 34.5 G/DL — SIGNIFICANT CHANGE UP (ref 32–36)
MCV RBC AUTO: 83.3 FL — SIGNIFICANT CHANGE UP (ref 80–100)
MONOCYTES # BLD AUTO: 1.11 K/UL — HIGH (ref 0–0.9)
MONOCYTES NFR BLD AUTO: 9 % — SIGNIFICANT CHANGE UP (ref 2–14)
NEUTROPHILS # BLD AUTO: 7.74 K/UL — HIGH (ref 1.8–7.4)
NEUTROPHILS NFR BLD AUTO: 63.2 % — SIGNIFICANT CHANGE UP (ref 43–77)
NITRITE UR-MCNC: NEGATIVE — SIGNIFICANT CHANGE UP
NRBC # BLD AUTO: 0 K/UL — SIGNIFICANT CHANGE UP (ref 0–0)
NRBC # FLD: 0 K/UL — SIGNIFICANT CHANGE UP (ref 0–0)
NRBC BLD AUTO-RTO: 0 /100 WBCS — SIGNIFICANT CHANGE UP (ref 0–0)
PH UR: 6.5 — SIGNIFICANT CHANGE UP (ref 5–8)
PLATELET # BLD AUTO: 295 K/UL — SIGNIFICANT CHANGE UP (ref 150–400)
PMV BLD: 11.3 FL — SIGNIFICANT CHANGE UP (ref 7–13)
POTASSIUM SERPL-MCNC: 4.7 MMOL/L — SIGNIFICANT CHANGE UP (ref 3.5–5)
POTASSIUM SERPL-SCNC: 4.7 MMOL/L — SIGNIFICANT CHANGE UP (ref 3.5–5)
PROT SERPL-MCNC: 7.2 G/DL — SIGNIFICANT CHANGE UP (ref 6–8)
PROT UR-MCNC: 30 MG/DL
RBC # BLD: 5.39 M/UL — SIGNIFICANT CHANGE UP (ref 4.2–5.8)
RBC # FLD: 13.3 % — SIGNIFICANT CHANGE UP (ref 10.3–14.5)
RBC CASTS # UR COMP ASSIST: ABNORMAL /HPF
SODIUM SERPL-SCNC: 140 MMOL/L — SIGNIFICANT CHANGE UP (ref 135–146)
SP GR SPEC: >1.03 — HIGH (ref 1–1.03)
UROBILINOGEN FLD QL: 0.2 MG/DL — SIGNIFICANT CHANGE UP (ref 0.2–1)
WBC # BLD: 12.27 K/UL — HIGH (ref 3.8–10.5)
WBC # FLD AUTO: 12.27 K/UL — HIGH (ref 3.8–10.5)
WBC UR QL: 3 /HPF — SIGNIFICANT CHANGE UP (ref 0–5)

## 2025-09-14 PROCEDURE — 36415 COLL VENOUS BLD VENIPUNCTURE: CPT

## 2025-09-14 PROCEDURE — 96376 TX/PRO/DX INJ SAME DRUG ADON: CPT

## 2025-09-14 PROCEDURE — 99285 EMERGENCY DEPT VISIT HI MDM: CPT | Mod: FS

## 2025-09-14 PROCEDURE — 81001 URINALYSIS AUTO W/SCOPE: CPT

## 2025-09-14 PROCEDURE — 83690 ASSAY OF LIPASE: CPT

## 2025-09-14 PROCEDURE — 80053 COMPREHEN METABOLIC PANEL: CPT

## 2025-09-14 PROCEDURE — 83605 ASSAY OF LACTIC ACID: CPT

## 2025-09-14 PROCEDURE — 96374 THER/PROPH/DIAG INJ IV PUSH: CPT | Mod: XU

## 2025-09-14 PROCEDURE — 96375 TX/PRO/DX INJ NEW DRUG ADDON: CPT

## 2025-09-14 PROCEDURE — 85025 COMPLETE CBC W/AUTO DIFF WBC: CPT

## 2025-09-14 PROCEDURE — 74177 CT ABD & PELVIS W/CONTRAST: CPT | Mod: 26

## 2025-09-14 PROCEDURE — 99284 EMERGENCY DEPT VISIT MOD MDM: CPT | Mod: 25

## 2025-09-14 PROCEDURE — 74177 CT ABD & PELVIS W/CONTRAST: CPT

## 2025-09-14 RX ORDER — ONDANSETRON HCL/PF 4 MG/2 ML
4 VIAL (ML) INJECTION ONCE
Refills: 0 | Status: COMPLETED | OUTPATIENT
Start: 2025-09-14 | End: 2025-09-14

## 2025-09-14 RX ORDER — HYDROMORPHONE/SOD CHLOR,ISO/PF 2 MG/10 ML
1 SYRINGE (ML) INJECTION ONCE
Refills: 0 | Status: DISCONTINUED | OUTPATIENT
Start: 2025-09-14 | End: 2025-09-14

## 2025-09-14 RX ORDER — SODIUM CHLORIDE 9 G/1000ML
1000 INJECTION, SOLUTION INTRAVENOUS ONCE
Refills: 0 | Status: COMPLETED | OUTPATIENT
Start: 2025-09-14 | End: 2025-09-14

## 2025-09-14 RX ORDER — KETOROLAC TROMETHAMINE 30 MG/ML
15 INJECTION, SOLUTION INTRAMUSCULAR; INTRAVENOUS ONCE
Refills: 0 | Status: DISCONTINUED | OUTPATIENT
Start: 2025-09-14 | End: 2025-09-14

## 2025-09-14 RX ADMIN — SODIUM CHLORIDE 1000 MILLILITER(S): 9 INJECTION, SOLUTION INTRAVENOUS at 21:31

## 2025-09-14 RX ADMIN — Medication 4 MILLIGRAM(S): at 23:21

## 2025-09-14 RX ADMIN — Medication 6 MILLIGRAM(S): at 21:45

## 2025-09-14 RX ADMIN — KETOROLAC TROMETHAMINE 15 MILLIGRAM(S): 30 INJECTION, SOLUTION INTRAMUSCULAR; INTRAVENOUS at 21:30

## 2025-09-14 RX ADMIN — Medication 4 MILLIGRAM(S): at 21:30

## 2025-09-14 RX ADMIN — Medication 1 MILLIGRAM(S): at 23:21

## 2025-09-15 RX ORDER — KETOROLAC TROMETHAMINE 30 MG/ML
1 INJECTION, SOLUTION INTRAMUSCULAR; INTRAVENOUS
Qty: 12 | Refills: 0
Start: 2025-09-15 | End: 2025-09-18

## 2025-09-15 RX ORDER — KETOROLAC TROMETHAMINE 30 MG/ML
15 INJECTION, SOLUTION INTRAMUSCULAR; INTRAVENOUS ONCE
Refills: 0 | Status: DISCONTINUED | OUTPATIENT
Start: 2025-09-15 | End: 2025-09-15

## 2025-09-15 RX ORDER — TAMSULOSIN HYDROCHLORIDE 0.4 MG/1
1 CAPSULE ORAL
Qty: 7 | Refills: 0
Start: 2025-09-15 | End: 2025-09-21

## 2025-09-15 RX ORDER — OXYCODONE HYDROCHLORIDE AND ACETAMINOPHEN 10; 325 MG/1; MG/1
1 TABLET ORAL
Qty: 12 | Refills: 0
Start: 2025-09-15 | End: 2025-09-17

## 2025-09-15 RX ORDER — ONDANSETRON HCL/PF 4 MG/2 ML
1 VIAL (ML) INJECTION
Qty: 9 | Refills: 0
Start: 2025-09-15 | End: 2025-09-17

## 2025-09-15 RX ORDER — TAMSULOSIN HYDROCHLORIDE 0.4 MG/1
0.4 CAPSULE ORAL AT BEDTIME
Refills: 0 | Status: DISCONTINUED | OUTPATIENT
Start: 2025-09-15 | End: 2025-09-15

## 2025-09-15 RX ADMIN — KETOROLAC TROMETHAMINE 15 MILLIGRAM(S): 30 INJECTION, SOLUTION INTRAMUSCULAR; INTRAVENOUS at 00:46

## 2025-09-15 RX ADMIN — TAMSULOSIN HYDROCHLORIDE 0.4 MILLIGRAM(S): 0.4 CAPSULE ORAL at 00:46

## 2025-09-17 ENCOUNTER — TRANSCRIPTION ENCOUNTER (OUTPATIENT)
Age: 65
End: 2025-09-17

## 2025-09-22 PROBLEM — E66.3 OVERWEIGHT: Status: ACTIVE | Noted: 2025-09-22

## 2025-09-22 PROBLEM — N20.1 RIGHT URETERAL STONE: Status: ACTIVE | Noted: 2025-09-22
